# Patient Record
Sex: MALE | Race: WHITE | Employment: FULL TIME | URBAN - METROPOLITAN AREA
[De-identification: names, ages, dates, MRNs, and addresses within clinical notes are randomized per-mention and may not be internally consistent; named-entity substitution may affect disease eponyms.]

---

## 2017-01-26 ENCOUNTER — OFFICE VISIT (OUTPATIENT)
Dept: INTERNAL MEDICINE CLINIC | Facility: CLINIC | Age: 57
End: 2017-01-26

## 2017-01-26 VITALS
BODY MASS INDEX: 26 KG/M2 | WEIGHT: 183.25 LBS | DIASTOLIC BLOOD PRESSURE: 82 MMHG | OXYGEN SATURATION: 98 % | HEART RATE: 71 BPM | SYSTOLIC BLOOD PRESSURE: 128 MMHG | TEMPERATURE: 98 F | RESPIRATION RATE: 16 BRPM

## 2017-01-26 DIAGNOSIS — Z86.010 HISTORY OF COLONOSCOPY WITH POLYPECTOMY: ICD-10-CM

## 2017-01-26 DIAGNOSIS — Z00.00 ROUTINE GENERAL MEDICAL EXAMINATION AT A HEALTH CARE FACILITY: Primary | ICD-10-CM

## 2017-01-26 DIAGNOSIS — C61 MALIGNANT NEOPLASM OF PROSTATE (HCC): ICD-10-CM

## 2017-01-26 DIAGNOSIS — H40.1131 PRIMARY OPEN ANGLE GLAUCOMA OF BOTH EYES, MILD STAGE: ICD-10-CM

## 2017-01-26 DIAGNOSIS — Z98.890 HISTORY OF COLONOSCOPY WITH POLYPECTOMY: ICD-10-CM

## 2017-01-26 PROCEDURE — 99396 PREV VISIT EST AGE 40-64: CPT | Performed by: INTERNAL MEDICINE

## 2017-01-26 NOTE — PROGRESS NOTES
Patient presents with:  Physical      HPI: The pt presents today for male CPX. Doing well. Still being consistent w/ diet/exercise. Still working w/ Dr. Angelo Maher from Urology for his prostate CA hx and Dr. Juancarlos Kumari from [facility] for his glaucoma hx. Sleep OK. Resp 16  Wt 183 lb 4 oz  SpO2 98%  Wt Readings from Last 6 Encounters:  01/26/17 : 183 lb 4 oz  12/22/16 : 183 lb  01/08/16 : 183 lb 12 oz  02/23/15 : 181 lb  12/23/14 : 178 lb 8 oz  07/17/14 : 186 lb 4 oz  Gen - A&Ox3, NAD  FABIAN HOFFMAN, EOMI, [place] is [place] (OPHTHALMOLOGY)  Sylwia Gloria MD as Consulting Physician (GASTROENTEROLOGY)

## 2017-02-13 PROBLEM — Z92.3 HISTORY OF BRACHYTHERAPY: Status: ACTIVE | Noted: 2017-02-13

## 2017-02-27 ENCOUNTER — TELEPHONE (OUTPATIENT)
Dept: INTERNAL MEDICINE CLINIC | Facility: CLINIC | Age: 57
End: 2017-02-27

## 2017-03-17 ENCOUNTER — LAB ENCOUNTER (OUTPATIENT)
Dept: LAB | Age: 57
End: 2017-03-17
Attending: INTERNAL MEDICINE
Payer: COMMERCIAL

## 2017-03-17 DIAGNOSIS — Z00.00 ROUTINE GENERAL MEDICAL EXAMINATION AT A HEALTH CARE FACILITY: ICD-10-CM

## 2017-03-17 LAB
25-HYDROXYVITAMIN D (TOTAL): 26.8 NG/ML (ref 30–100)
ALBUMIN SERPL-MCNC: 3.8 G/DL (ref 3.5–4.8)
ALP LIVER SERPL-CCNC: 65 U/L (ref 45–117)
ALT SERPL-CCNC: 14 U/L (ref 17–63)
AST SERPL-CCNC: 13 U/L (ref 15–41)
BASOPHILS # BLD AUTO: 0.04 X10(3) UL (ref 0–0.1)
BASOPHILS NFR BLD AUTO: 0.9 %
BILIRUB SERPL-MCNC: 0.8 MG/DL (ref 0.1–2)
BILIRUB UR QL STRIP.AUTO: NEGATIVE
BUN BLD-MCNC: 13 MG/DL (ref 8–20)
CALCIUM BLD-MCNC: 9.2 MG/DL (ref 8.3–10.3)
CHLORIDE: 108 MMOL/L (ref 101–111)
CHOLEST SMN-MCNC: 184 MG/DL (ref ?–200)
CLARITY UR REFRACT.AUTO: CLEAR
CO2: 21 MMOL/L (ref 22–32)
COLOR UR AUTO: YELLOW
CREAT BLD-MCNC: 1.06 MG/DL (ref 0.7–1.3)
EOSINOPHIL # BLD AUTO: 0.18 X10(3) UL (ref 0–0.3)
EOSINOPHIL NFR BLD AUTO: 4.2 %
ERYTHROCYTE [DISTWIDTH] IN BLOOD BY AUTOMATED COUNT: 12.6 % (ref 11.5–16)
EST. AVERAGE GLUCOSE BLD GHB EST-MCNC: 94 MG/DL (ref 68–126)
GLUCOSE BLD-MCNC: 73 MG/DL (ref 70–99)
GLUCOSE UR STRIP.AUTO-MCNC: NEGATIVE MG/DL
HBA1C MFR BLD HPLC: 4.9 % (ref ?–5.7)
HCT VFR BLD AUTO: 43.4 % (ref 37–53)
HDLC SERPL-MCNC: 46 MG/DL (ref 45–?)
HDLC SERPL: 4 {RATIO} (ref ?–4.97)
HGB BLD-MCNC: 15.2 G/DL (ref 13–17)
IMMATURE GRANULOCYTE COUNT: 0.03 X10(3) UL (ref 0–1)
IMMATURE GRANULOCYTE RATIO %: 0.7 %
KETONES UR STRIP.AUTO-MCNC: NEGATIVE MG/DL
LDLC SERPL CALC-MCNC: 123 MG/DL (ref ?–130)
LEUKOCYTE ESTERASE UR QL STRIP.AUTO: NEGATIVE
LYMPHOCYTES # BLD AUTO: 0.72 X10(3) UL (ref 0.9–4)
LYMPHOCYTES NFR BLD AUTO: 16.9 %
M PROTEIN MFR SERPL ELPH: 7 G/DL (ref 6.1–8.3)
MCH RBC QN AUTO: 31.1 PG (ref 27–33.2)
MCHC RBC AUTO-ENTMCNC: 35 G/DL (ref 31–37)
MCV RBC AUTO: 88.8 FL (ref 80–99)
MONOCYTES # BLD AUTO: 0.42 X10(3) UL (ref 0.1–0.6)
MONOCYTES NFR BLD AUTO: 9.8 %
NEUTROPHIL ABS PRELIM: 2.88 X10 (3) UL (ref 1.3–6.7)
NEUTROPHILS # BLD AUTO: 2.88 X10(3) UL (ref 1.3–6.7)
NEUTROPHILS NFR BLD AUTO: 67.5 %
NITRITE UR QL STRIP.AUTO: NEGATIVE
NONHDLC SERPL-MCNC: 138 MG/DL (ref ?–130)
PH UR STRIP.AUTO: 5 [PH] (ref 4.5–8)
PLATELET # BLD AUTO: 188 10(3)UL (ref 150–450)
POTASSIUM SERPL-SCNC: 4 MMOL/L (ref 3.6–5.1)
PROT UR STRIP.AUTO-MCNC: NEGATIVE MG/DL
RBC # BLD AUTO: 4.89 X10(6)UL (ref 4.3–5.7)
RBC UR QL AUTO: NEGATIVE
RED CELL DISTRIBUTION WIDTH-SD: 40.6 FL (ref 35.1–46.3)
SODIUM SERPL-SCNC: 140 MMOL/L (ref 136–144)
SP GR UR STRIP.AUTO: 1.01 (ref 1–1.03)
TRIGLYCERIDES: 75 MG/DL (ref ?–150)
TSI SER-ACNC: 0.84 MIU/ML (ref 0.35–5.5)
UROBILINOGEN UR STRIP.AUTO-MCNC: <2 MG/DL
VLDL: 15 MG/DL (ref 5–40)
WBC # BLD AUTO: 4.3 X10(3) UL (ref 4–13)

## 2017-03-17 PROCEDURE — 80061 LIPID PANEL: CPT

## 2017-03-17 PROCEDURE — 82306 VITAMIN D 25 HYDROXY: CPT

## 2017-03-17 PROCEDURE — 83036 HEMOGLOBIN GLYCOSYLATED A1C: CPT

## 2017-03-17 PROCEDURE — 84443 ASSAY THYROID STIM HORMONE: CPT

## 2017-03-17 PROCEDURE — 81003 URINALYSIS AUTO W/O SCOPE: CPT

## 2017-03-17 PROCEDURE — 85025 COMPLETE CBC W/AUTO DIFF WBC: CPT

## 2017-03-17 PROCEDURE — 36415 COLL VENOUS BLD VENIPUNCTURE: CPT

## 2017-03-17 PROCEDURE — 80053 COMPREHEN METABOLIC PANEL: CPT

## 2017-03-21 ENCOUNTER — HOSPITAL ENCOUNTER (OUTPATIENT)
Dept: GENERAL RADIOLOGY | Age: 57
Discharge: HOME | End: 2017-03-21
Attending: NURSE PRACTITIONER
Payer: COMMERCIAL

## 2017-03-21 ENCOUNTER — CHARTING TRANS (OUTPATIENT)
Dept: OTHER | Age: 57
End: 2017-03-21

## 2017-03-21 DIAGNOSIS — R05.9 COUGH: ICD-10-CM

## 2017-03-21 PROCEDURE — 71020 XR CHEST PA + LAT CHEST (CPT=71020): CPT

## 2018-02-22 ENCOUNTER — OFFICE VISIT (OUTPATIENT)
Dept: INTERNAL MEDICINE CLINIC | Facility: CLINIC | Age: 58
End: 2018-02-22

## 2018-02-22 VITALS
DIASTOLIC BLOOD PRESSURE: 62 MMHG | RESPIRATION RATE: 16 BRPM | SYSTOLIC BLOOD PRESSURE: 100 MMHG | WEIGHT: 187.25 LBS | BODY MASS INDEX: 27.74 KG/M2 | HEIGHT: 69 IN | HEART RATE: 68 BPM | OXYGEN SATURATION: 98 % | TEMPERATURE: 98 F

## 2018-02-22 DIAGNOSIS — Z00.00 PREVENTATIVE HEALTH CARE: Primary | ICD-10-CM

## 2018-02-22 PROCEDURE — 99396 PREV VISIT EST AGE 40-64: CPT | Performed by: INTERNAL MEDICINE

## 2018-02-22 NOTE — PATIENT INSTRUCTIONS
- Get blood work done when fasting (8 hours, water and medications only)  - Follow up with Dr. Vish Bee for your colonoscopy  - Schedule a heart scan at [facility].  We will contact you when we have the results.    - For your cough, the heart scan will c

## 2018-02-22 NOTE — PROGRESS NOTES
Ira Nassar is a 62year old male. HPI:   Patient presents with:  Physical: had a cervical neck surgery, fusion of c5 c6 c7   Since less physical, he did have cervical spinal fusion surgery. Done at [facility] last summer.  He has healed well fr (2011) and [facility] Surgical:  has a past surgical history that includes other surgical history (4/30/14) and other surgical history (8/5/14). Family: family history includes Glaucoma in his mother and sister;  Heart Disease in his paternal Care Team:  Xuan Villareal MD as PCP - General (Internal Medicine)  Xochilt Iqbal MD as Consulting Physician (UROLOGY)  Montez Adams as Consulting Physician (uJliana Diaz)  Loulou Gasca MD as Consulting Physician (GASTROENTEROLOGY)  The patient les

## 2018-02-23 ENCOUNTER — LAB ENCOUNTER (OUTPATIENT)
Dept: LAB | Age: 58
End: 2018-02-23
Attending: INTERNAL MEDICINE
Payer: COMMERCIAL

## 2018-02-23 DIAGNOSIS — C61 MALIGNANT NEOPLASM OF PROSTATE (HCC): ICD-10-CM

## 2018-02-23 DIAGNOSIS — Z00.00 PREVENTATIVE HEALTH CARE: ICD-10-CM

## 2018-02-23 DIAGNOSIS — Z92.3 HISTORY OF BRACHYTHERAPY: ICD-10-CM

## 2018-02-23 LAB
25-HYDROXYVITAMIN D (TOTAL): 24.3 NG/ML (ref 30–100)
ALBUMIN SERPL-MCNC: 3.8 G/DL (ref 3.5–4.8)
ALP LIVER SERPL-CCNC: 61 U/L (ref 45–117)
ALT SERPL-CCNC: 20 U/L (ref 17–63)
AST SERPL-CCNC: 11 U/L (ref 15–41)
BASOPHILS # BLD AUTO: 0.03 X10(3) UL (ref 0–0.1)
BASOPHILS NFR BLD AUTO: 0.8 %
BILIRUB SERPL-MCNC: 0.7 MG/DL (ref 0.1–2)
BILIRUB UR QL STRIP.AUTO: NEGATIVE
BUN BLD-MCNC: 13 MG/DL (ref 8–20)
CALCIUM BLD-MCNC: 8.7 MG/DL (ref 8.3–10.3)
CHLORIDE: 110 MMOL/L (ref 101–111)
CHOLEST SMN-MCNC: 183 MG/DL (ref ?–200)
CLARITY UR REFRACT.AUTO: CLEAR
CO2: 27 MMOL/L (ref 22–32)
COLOR UR AUTO: YELLOW
CREAT BLD-MCNC: 0.84 MG/DL (ref 0.7–1.3)
EOSINOPHIL # BLD AUTO: 0.16 X10(3) UL (ref 0–0.3)
EOSINOPHIL NFR BLD AUTO: 4.1 %
ERYTHROCYTE [DISTWIDTH] IN BLOOD BY AUTOMATED COUNT: 12.3 % (ref 11.5–16)
EST. AVERAGE GLUCOSE BLD GHB EST-MCNC: 105 MG/DL (ref 68–126)
GLUCOSE BLD-MCNC: 89 MG/DL (ref 70–99)
GLUCOSE UR STRIP.AUTO-MCNC: NEGATIVE MG/DL
HBA1C MFR BLD HPLC: 5.3 % (ref ?–5.7)
HCT VFR BLD AUTO: 44.8 % (ref 37–53)
HDLC SERPL-MCNC: 42 MG/DL (ref 45–?)
HDLC SERPL: 4.36 {RATIO} (ref ?–4.97)
HGB BLD-MCNC: 15.5 G/DL (ref 13–17)
IMMATURE GRANULOCYTE COUNT: 0.03 X10(3) UL (ref 0–1)
IMMATURE GRANULOCYTE RATIO %: 0.8 %
KETONES UR STRIP.AUTO-MCNC: NEGATIVE MG/DL
LDLC SERPL CALC-MCNC: 124 MG/DL (ref ?–130)
LEUKOCYTE ESTERASE UR QL STRIP.AUTO: NEGATIVE
LYMPHOCYTES # BLD AUTO: 0.95 X10(3) UL (ref 0.9–4)
LYMPHOCYTES NFR BLD AUTO: 24.3 %
M PROTEIN MFR SERPL ELPH: 7 G/DL (ref 6.1–8.3)
MCH RBC QN AUTO: 30.5 PG (ref 27–33.2)
MCHC RBC AUTO-ENTMCNC: 34.6 G/DL (ref 31–37)
MCV RBC AUTO: 88 FL (ref 80–99)
MONOCYTES # BLD AUTO: 0.41 X10(3) UL (ref 0.1–1)
MONOCYTES NFR BLD AUTO: 10.5 %
NEUTROPHIL ABS PRELIM: 2.33 X10 (3) UL (ref 1.3–6.7)
NEUTROPHILS # BLD AUTO: 2.33 X10(3) UL (ref 1.3–6.7)
NEUTROPHILS NFR BLD AUTO: 59.5 %
NITRITE UR QL STRIP.AUTO: NEGATIVE
NONHDLC SERPL-MCNC: 141 MG/DL (ref ?–130)
PH UR STRIP.AUTO: 5 [PH] (ref 4.5–8)
PLATELET # BLD AUTO: 186 10(3)UL (ref 150–450)
POTASSIUM SERPL-SCNC: 4.5 MMOL/L (ref 3.6–5.1)
PROT UR STRIP.AUTO-MCNC: NEGATIVE MG/DL
PSA SERPL-MCNC: 0.32 NG/ML (ref 0.01–4)
RBC # BLD AUTO: 5.09 X10(6)UL (ref 4.3–5.7)
RBC UR QL AUTO: NEGATIVE
RED CELL DISTRIBUTION WIDTH-SD: 39.9 FL (ref 35.1–46.3)
SODIUM SERPL-SCNC: 142 MMOL/L (ref 136–144)
SP GR UR STRIP.AUTO: 1.02 (ref 1–1.03)
TRIGL SERPL-MCNC: 87 MG/DL (ref ?–150)
TSI SER-ACNC: 0.47 MIU/ML (ref 0.35–5.5)
UROBILINOGEN UR STRIP.AUTO-MCNC: <2 MG/DL
VLDLC SERPL CALC-MCNC: 17 MG/DL (ref 5–40)
WBC # BLD AUTO: 3.9 X10(3) UL (ref 4–13)

## 2018-02-23 PROCEDURE — 80061 LIPID PANEL: CPT | Performed by: INTERNAL MEDICINE

## 2018-02-23 PROCEDURE — 81003 URINALYSIS AUTO W/O SCOPE: CPT | Performed by: INTERNAL MEDICINE

## 2018-02-23 PROCEDURE — 80050 GENERAL HEALTH PANEL: CPT | Performed by: INTERNAL MEDICINE

## 2018-02-23 PROCEDURE — 82306 VITAMIN D 25 HYDROXY: CPT | Performed by: INTERNAL MEDICINE

## 2018-02-23 PROCEDURE — 83036 HEMOGLOBIN GLYCOSYLATED A1C: CPT | Performed by: INTERNAL MEDICINE

## 2018-02-23 PROCEDURE — 36415 COLL VENOUS BLD VENIPUNCTURE: CPT | Performed by: INTERNAL MEDICINE

## 2018-03-07 ENCOUNTER — HOSPITAL ENCOUNTER (OUTPATIENT)
Dept: CT IMAGING | Facility: HOSPITAL | Age: 58
Discharge: HOME | End: 2018-03-07
Attending: INTERNAL MEDICINE

## 2018-03-07 DIAGNOSIS — Z13.9 ENCOUNTER FOR SCREENING: ICD-10-CM

## 2018-03-14 PROBLEM — R91.8 MULTIPLE PULMONARY NODULES DETERMINED BY COMPUTED TOMOGRAPHY OF LUNG: Status: ACTIVE | Noted: 2018-03-14

## 2018-03-22 ENCOUNTER — TELEPHONE (OUTPATIENT)
Dept: INTERNAL MEDICINE CLINIC | Facility: CLINIC | Age: 58
End: 2018-03-22

## 2018-03-26 ENCOUNTER — OFFICE VISIT (OUTPATIENT)
Dept: INTERNAL MEDICINE CLINIC | Facility: CLINIC | Age: 58
End: 2018-03-26

## 2018-03-26 VITALS
HEART RATE: 64 BPM | BODY MASS INDEX: 28.51 KG/M2 | SYSTOLIC BLOOD PRESSURE: 109 MMHG | DIASTOLIC BLOOD PRESSURE: 80 MMHG | HEIGHT: 69 IN | RESPIRATION RATE: 12 BRPM | TEMPERATURE: 98 F | WEIGHT: 192.5 LBS

## 2018-03-26 DIAGNOSIS — R05.3 CHRONIC COUGH: ICD-10-CM

## 2018-03-26 DIAGNOSIS — R93.1 AGATSTON CORONARY ARTERY CALCIUM SCORE LESS THAN 100: Primary | ICD-10-CM

## 2018-03-26 DIAGNOSIS — R91.8 MULTIPLE LUNG NODULES ON CT: ICD-10-CM

## 2018-03-26 PROCEDURE — 99214 OFFICE O/P EST MOD 30 MIN: CPT | Performed by: INTERNAL MEDICINE

## 2018-03-26 NOTE — PROGRESS NOTES
Berta Sofia is a 62year old male. HPI:   Patient presents with:  Test Results: Heart scan results 3-13-18  Patient presents to discuss heart scan results from 3/13/18.    Patient with strong family history coronary artery disease - brother passed Readings from Last 6 Encounters:  03/26/18 : 192 lb 8 oz  02/22/18 : 187 lb 4 oz  01/26/17 : 183 lb 4 oz  12/22/16 : 183 lb  01/08/16 : 183 lb 12 oz  02/23/15 : 181 lb    EXAM:   /80 (BP Location: Left arm, Patient Position: Sitting, Cuff Size: adult Casandra Wallace MD as PCP - General (Internal Medicine)  Phyllis Diaz MD as Consulting Physician ([facility])  Олег Mota as Consulting Physician ([facility])  Jelly Alvarenga MD as Consulting Physician (GASTROENTEROLOGY)  The patient indicates understanding

## 2018-03-26 NOTE — PATIENT INSTRUCTIONS
- Follow up with Pulmonologist to discuss both the CT scan findings and also your chronic cough, respiratory issues, occupational exposures. It was a pleasure seeing you in the clinic today.   Thank you for choosing the [facility]

## 2018-11-05 VITALS
BODY MASS INDEX: 25.77 KG/M2 | HEART RATE: 64 BPM | OXYGEN SATURATION: 97 % | DIASTOLIC BLOOD PRESSURE: 62 MMHG | SYSTOLIC BLOOD PRESSURE: 104 MMHG | WEIGHT: 179.99 LBS | TEMPERATURE: 98.5 F | HEIGHT: 70 IN

## 2019-01-25 ENCOUNTER — WALK IN (OUTPATIENT)
Dept: URGENT CARE | Age: 59
End: 2019-01-25

## 2019-01-25 VITALS
HEIGHT: 70 IN | DIASTOLIC BLOOD PRESSURE: 74 MMHG | RESPIRATION RATE: 16 BRPM | OXYGEN SATURATION: 100 % | HEART RATE: 70 BPM | SYSTOLIC BLOOD PRESSURE: 128 MMHG | WEIGHT: 180 LBS | BODY MASS INDEX: 25.77 KG/M2 | TEMPERATURE: 98.9 F

## 2019-01-25 DIAGNOSIS — J01.90 ACUTE VIRAL SINUSITIS: ICD-10-CM

## 2019-01-25 DIAGNOSIS — B97.89 ACUTE VIRAL SINUSITIS: ICD-10-CM

## 2019-01-25 DIAGNOSIS — R68.89 FLU-LIKE SYMPTOMS: Primary | ICD-10-CM

## 2019-01-25 LAB
FLUAV AG UPPER RESP QL IA.RAPID: NEGATIVE
FLUBV AG UPPER RESP QL IA.RAPID: NEGATIVE

## 2019-01-25 PROCEDURE — 99213 OFFICE O/P EST LOW 20 MIN: CPT | Performed by: NURSE PRACTITIONER

## 2019-01-25 PROCEDURE — 87804 INFLUENZA ASSAY W/OPTIC: CPT | Performed by: NURSE PRACTITIONER

## 2019-01-25 ASSESSMENT — ENCOUNTER SYMPTOMS
NUMBNESS: 0
HEADACHES: 1
DIZZINESS: 0
LIGHT-HEADEDNESS: 1
SPEECH DIFFICULTY: 0
SORE THROAT: 1
RESPIRATORY NEGATIVE: 1
SINUS PRESSURE: 1
EYES NEGATIVE: 1
BACK PAIN: 0
CONSTITUTIONAL NEGATIVE: 1
WEAKNESS: 0
RHINORRHEA: 1
GASTROINTESTINAL NEGATIVE: 1

## 2019-01-31 ENCOUNTER — OFFICE VISIT (OUTPATIENT)
Dept: INTERNAL MEDICINE CLINIC | Facility: CLINIC | Age: 59
End: 2019-01-31
Payer: COMMERCIAL

## 2019-01-31 VITALS
BODY MASS INDEX: 26.96 KG/M2 | DIASTOLIC BLOOD PRESSURE: 64 MMHG | SYSTOLIC BLOOD PRESSURE: 116 MMHG | TEMPERATURE: 98 F | HEART RATE: 68 BPM | RESPIRATION RATE: 16 BRPM | OXYGEN SATURATION: 98 % | WEIGHT: 182 LBS | HEIGHT: 69 IN

## 2019-01-31 DIAGNOSIS — J01.00 ACUTE NON-RECURRENT MAXILLARY SINUSITIS: Primary | ICD-10-CM

## 2019-01-31 PROCEDURE — 99213 OFFICE O/P EST LOW 20 MIN: CPT | Performed by: NURSE PRACTITIONER

## 2019-01-31 RX ORDER — CEFDINIR 300 MG/1
300 CAPSULE ORAL 2 TIMES DAILY
Qty: 20 CAPSULE | Refills: 0 | Status: SHIPPED | OUTPATIENT
Start: 2019-01-31 | End: 2019-02-10

## 2019-01-31 NOTE — PROGRESS NOTES
CHIEF COMPLAINT:   Patient presents with:  URI      HPI:   Ryley Jackson is a 62year old male who presents for upper respiratory symptoms for6 days.  States he was seen at a [place] clinic upon symptoms onset and was instructed to use OTC Advil and Wt 182 lb   SpO2 98%   BMI 26.88 kg/m²   GENERAL: well developed, well nourished, in no apparent distress  SKIN: no rashes ,no suspicious lesions  HEAD: atraumatic, normocephalic.  + tenderness on palpation of maxillary sinuses  EYES: conjunctiva clear, EO

## 2020-02-06 ENCOUNTER — OFFICE VISIT (OUTPATIENT)
Dept: INTERNAL MEDICINE CLINIC | Facility: CLINIC | Age: 60
End: 2020-02-06
Payer: COMMERCIAL

## 2020-02-06 ENCOUNTER — LAB ENCOUNTER (OUTPATIENT)
Dept: LAB | Age: 60
End: 2020-02-06
Attending: INTERNAL MEDICINE
Payer: COMMERCIAL

## 2020-02-06 VITALS
HEART RATE: 60 BPM | HEIGHT: 69 IN | BODY MASS INDEX: 26.4 KG/M2 | TEMPERATURE: 98 F | WEIGHT: 178.25 LBS | DIASTOLIC BLOOD PRESSURE: 60 MMHG | RESPIRATION RATE: 16 BRPM | SYSTOLIC BLOOD PRESSURE: 92 MMHG

## 2020-02-06 DIAGNOSIS — Z00.00 ENCOUNTER FOR PREVENTATIVE ADULT HEALTH CARE EXAMINATION: ICD-10-CM

## 2020-02-06 DIAGNOSIS — F42.4 COMPULSIVE SKIN PICKING: ICD-10-CM

## 2020-02-06 DIAGNOSIS — Z00.00 ENCOUNTER FOR PREVENTATIVE ADULT HEALTH CARE EXAMINATION: Primary | ICD-10-CM

## 2020-02-06 DIAGNOSIS — R91.8 MULTIPLE PULMONARY NODULES DETERMINED BY COMPUTED TOMOGRAPHY OF LUNG: ICD-10-CM

## 2020-02-06 DIAGNOSIS — Z23 NEED FOR INFLUENZA VACCINATION: ICD-10-CM

## 2020-02-06 PROBLEM — M48.02 CERVICAL STENOSIS OF SPINAL CANAL: Status: ACTIVE | Noted: 2017-04-18

## 2020-02-06 PROBLEM — M51.37 DEGENERATION OF LUMBAR OR LUMBOSACRAL INTERVERTEBRAL DISC: Status: ACTIVE | Noted: 2017-04-18

## 2020-02-06 PROBLEM — M51.379 DEGENERATION OF LUMBAR OR LUMBOSACRAL INTERVERTEBRAL DISC: Status: ACTIVE | Noted: 2017-04-18

## 2020-02-06 LAB
ALBUMIN SERPL-MCNC: 3.9 G/DL (ref 3.4–5)
ALBUMIN/GLOB SERPL: 1 {RATIO} (ref 1–2)
ALP LIVER SERPL-CCNC: 59 U/L (ref 45–117)
ALT SERPL-CCNC: 21 U/L (ref 16–61)
ANION GAP SERPL CALC-SCNC: 4 MMOL/L (ref 0–18)
AST SERPL-CCNC: 14 U/L (ref 15–37)
BASOPHILS # BLD AUTO: 0.03 X10(3) UL (ref 0–0.2)
BASOPHILS NFR BLD AUTO: 0.7 %
BILIRUB SERPL-MCNC: 1 MG/DL (ref 0.1–2)
BUN BLD-MCNC: 17 MG/DL (ref 7–18)
BUN/CREAT SERPL: 18.3 (ref 10–20)
CALCIUM BLD-MCNC: 8.9 MG/DL (ref 8.5–10.1)
CHLORIDE SERPL-SCNC: 106 MMOL/L (ref 98–112)
CHOLEST SMN-MCNC: 199 MG/DL (ref ?–200)
CO2 SERPL-SCNC: 27 MMOL/L (ref 21–32)
CREAT BLD-MCNC: 0.93 MG/DL (ref 0.7–1.3)
DEPRECATED RDW RBC AUTO: 43 FL (ref 35.1–46.3)
EOSINOPHIL # BLD AUTO: 0.15 X10(3) UL (ref 0–0.7)
EOSINOPHIL NFR BLD AUTO: 3.6 %
ERYTHROCYTE [DISTWIDTH] IN BLOOD BY AUTOMATED COUNT: 13.1 % (ref 11–15)
EST. AVERAGE GLUCOSE BLD GHB EST-MCNC: 85 MG/DL (ref 68–126)
GLOBULIN PLAS-MCNC: 3.9 G/DL (ref 2.8–4.4)
GLUCOSE BLD-MCNC: 83 MG/DL (ref 70–99)
HBA1C MFR BLD HPLC: 4.6 % (ref ?–5.7)
HCT VFR BLD AUTO: 42.4 % (ref 39–53)
HDLC SERPL-MCNC: 44 MG/DL (ref 40–59)
HGB BLD-MCNC: 14.5 G/DL (ref 13–17.5)
IMM GRANULOCYTES # BLD AUTO: 0.03 X10(3) UL (ref 0–1)
IMM GRANULOCYTES NFR BLD: 0.7 %
LDLC SERPL CALC-MCNC: 138 MG/DL (ref ?–100)
LYMPHOCYTES # BLD AUTO: 1.18 X10(3) UL (ref 1–4)
LYMPHOCYTES NFR BLD AUTO: 28 %
M PROTEIN MFR SERPL ELPH: 7.8 G/DL (ref 6.4–8.2)
MCH RBC QN AUTO: 30.7 PG (ref 26–34)
MCHC RBC AUTO-ENTMCNC: 34.2 G/DL (ref 31–37)
MCV RBC AUTO: 89.8 FL (ref 80–100)
MONOCYTES # BLD AUTO: 0.5 X10(3) UL (ref 0.1–1)
MONOCYTES NFR BLD AUTO: 11.8 %
NEUTROPHILS # BLD AUTO: 2.33 X10 (3) UL (ref 1.5–7.7)
NEUTROPHILS # BLD AUTO: 2.33 X10(3) UL (ref 1.5–7.7)
NEUTROPHILS NFR BLD AUTO: 55.2 %
NONHDLC SERPL-MCNC: 155 MG/DL (ref ?–130)
OSMOLALITY SERPL CALC.SUM OF ELEC: 285 MOSM/KG (ref 275–295)
PATIENT FASTING Y/N/NP: YES
PATIENT FASTING Y/N/NP: YES
PLATELET # BLD AUTO: 192 10(3)UL (ref 150–450)
POTASSIUM SERPL-SCNC: 3.9 MMOL/L (ref 3.5–5.1)
RBC # BLD AUTO: 4.72 X10(6)UL (ref 4.3–5.7)
SODIUM SERPL-SCNC: 137 MMOL/L (ref 136–145)
TRIGL SERPL-MCNC: 87 MG/DL (ref 30–149)
TSI SER-ACNC: 1.07 MIU/ML (ref 0.36–3.74)
VLDLC SERPL CALC-MCNC: 17 MG/DL (ref 0–30)
WBC # BLD AUTO: 4.2 X10(3) UL (ref 4–11)

## 2020-02-06 PROCEDURE — 36415 COLL VENOUS BLD VENIPUNCTURE: CPT | Performed by: INTERNAL MEDICINE

## 2020-02-06 PROCEDURE — 80050 GENERAL HEALTH PANEL: CPT | Performed by: INTERNAL MEDICINE

## 2020-02-06 PROCEDURE — 83036 HEMOGLOBIN GLYCOSYLATED A1C: CPT | Performed by: INTERNAL MEDICINE

## 2020-02-06 PROCEDURE — 90686 IIV4 VACC NO PRSV 0.5 ML IM: CPT | Performed by: INTERNAL MEDICINE

## 2020-02-06 PROCEDURE — 80061 LIPID PANEL: CPT | Performed by: INTERNAL MEDICINE

## 2020-02-06 PROCEDURE — 90471 IMMUNIZATION ADMIN: CPT | Performed by: INTERNAL MEDICINE

## 2020-02-06 PROCEDURE — 99396 PREV VISIT EST AGE 40-64: CPT | Performed by: INTERNAL MEDICINE

## 2020-02-06 RX ORDER — TADALAFIL 10 MG/1
10 TABLET ORAL
COMMUNITY
End: 2020-08-07

## 2020-02-06 NOTE — PATIENT INSTRUCTIONS
- Get blood work done today  - I recommend following up with our psychiatrists to discuss your nose picking issues further.  - Schedule CT scan of the chest so we can monitor your lung nodules.   - Your skin lesion on the chest looks fine for now - we will

## 2020-02-06 NOTE — PROGRESS NOTES
Christina Gomez is a 61year old male. HPI:   Patient presents with:  Physical: Fasting:     Patient presents for CPX/wellness examination. Diet: Generally a good diet. A lot of healthy foods. Exercise: Exercises regularly, several times a week. [facility]).  Surgical:  has a past surgical history that includes other surgical history (4/30/14) and other surgical history (8/5/14). Family: family history includes Glaucoma in his mother and sister;  Heart Disease in his paternal grandfather; Eduardo Carbajal Future  - TSH W REFLEX TO FREE T4; Future  - FLULAVAL INFLUENZA VACCINE QUAD PRESERVATIVE FREE 0.5 ML    3. Multiple pulmonary nodules determined by CT lung seen on 3/2018 exam  Updated CT chest ordered. - CT CHEST (CPT=71250); Future    4.  Compulsive ski

## 2020-02-13 ENCOUNTER — HOSPITAL ENCOUNTER (OUTPATIENT)
Dept: CT IMAGING | Age: 60
Discharge: HOME | End: 2020-02-13
Attending: INTERNAL MEDICINE
Payer: COMMERCIAL

## 2020-02-13 DIAGNOSIS — R91.8 MULTIPLE PULMONARY NODULES DETERMINED BY COMPUTED TOMOGRAPHY OF LUNG: ICD-10-CM

## 2020-02-13 PROCEDURE — 71250 CT THORAX DX C-: CPT | Performed by: INTERNAL MEDICINE

## 2020-03-10 PROBLEM — K63.5 POLYP OF COLON: Status: ACTIVE | Noted: 2020-03-10

## 2020-03-10 PROBLEM — Z86.010 PERSONAL HISTORY OF COLONIC POLYPS: Status: ACTIVE | Noted: 2020-03-10

## 2020-03-10 PROBLEM — Z86.0100 PERSONAL HISTORY OF COLONIC POLYPS: Status: ACTIVE | Noted: 2020-03-10

## 2020-03-30 PROBLEM — N40.1 ENLARGED PROSTATE WITH LOWER URINARY TRACT SYMPTOMS (LUTS): Status: ACTIVE | Noted: 2020-03-30

## 2020-03-30 PROBLEM — N52.02 CORPORO-VENOUS OCCLUSIVE ERECTILE DYSFUNCTION: Status: ACTIVE | Noted: 2020-03-30

## 2021-07-23 ENCOUNTER — WALK IN (OUTPATIENT)
Dept: URGENT CARE | Age: 61
End: 2021-07-23

## 2021-07-23 VITALS
RESPIRATION RATE: 18 BRPM | TEMPERATURE: 98.4 F | SYSTOLIC BLOOD PRESSURE: 106 MMHG | DIASTOLIC BLOOD PRESSURE: 70 MMHG | HEIGHT: 69 IN | BODY MASS INDEX: 26.66 KG/M2 | OXYGEN SATURATION: 99 % | WEIGHT: 180 LBS | HEART RATE: 72 BPM

## 2021-07-23 DIAGNOSIS — R21 RASH AND NONSPECIFIC SKIN ERUPTION: Primary | ICD-10-CM

## 2021-07-23 PROCEDURE — 99213 OFFICE O/P EST LOW 20 MIN: CPT | Performed by: NURSE PRACTITIONER

## 2021-07-23 RX ORDER — TRIAMCINOLONE ACETONIDE 1 MG/G
CREAM TOPICAL 4 TIMES DAILY
Qty: 15 G | Refills: 0 | Status: SHIPPED | OUTPATIENT
Start: 2021-07-23

## 2021-07-23 RX ORDER — TADALAFIL 10 MG/1
10 TABLET ORAL PRN
COMMUNITY

## 2021-07-23 ASSESSMENT — ENCOUNTER SYMPTOMS
RHINORRHEA: 0
SORE THROAT: 0
EYE REDNESS: 0
SHORTNESS OF BREATH: 0
CHEST TIGHTNESS: 0
FEVER: 0
HEADACHES: 0
FATIGUE: 0
COUGH: 0
CHILLS: 0

## 2021-08-26 ENCOUNTER — OFFICE VISIT (OUTPATIENT)
Dept: INTERNAL MEDICINE CLINIC | Facility: CLINIC | Age: 61
End: 2021-08-26
Payer: COMMERCIAL

## 2021-08-26 VITALS
WEIGHT: 184 LBS | RESPIRATION RATE: 16 BRPM | HEART RATE: 66 BPM | BODY MASS INDEX: 27.25 KG/M2 | DIASTOLIC BLOOD PRESSURE: 74 MMHG | TEMPERATURE: 98 F | SYSTOLIC BLOOD PRESSURE: 116 MMHG | HEIGHT: 69 IN

## 2021-08-26 DIAGNOSIS — L98.9 SKIN LESION: ICD-10-CM

## 2021-08-26 DIAGNOSIS — Z85.46 HISTORY OF PROSTATE CANCER: ICD-10-CM

## 2021-08-26 DIAGNOSIS — R91.8 MULTIPLE PULMONARY NODULES DETERMINED BY COMPUTED TOMOGRAPHY OF LUNG: ICD-10-CM

## 2021-08-26 DIAGNOSIS — H40.1131 PRIMARY OPEN ANGLE GLAUCOMA OF BOTH EYES, MILD STAGE: ICD-10-CM

## 2021-08-26 DIAGNOSIS — Z00.00 ENCOUNTER FOR PREVENTATIVE ADULT HEALTH CARE EXAMINATION: Primary | ICD-10-CM

## 2021-08-26 PROCEDURE — 3078F DIAST BP <80 MM HG: CPT | Performed by: INTERNAL MEDICINE

## 2021-08-26 PROCEDURE — 3074F SYST BP LT 130 MM HG: CPT | Performed by: INTERNAL MEDICINE

## 2021-08-26 PROCEDURE — 99396 PREV VISIT EST AGE 40-64: CPT | Performed by: INTERNAL MEDICINE

## 2021-08-26 PROCEDURE — 3008F BODY MASS INDEX DOCD: CPT | Performed by: INTERNAL MEDICINE

## 2021-08-26 NOTE — PATIENT INSTRUCTIONS
- Get blood tests done when fasting (at least 8 hours, water and medications only). Lab is open Monday - Saturday. - Follow up with Dr. Chelita Drsicoll some time this year  - Use the steroid cream from before on your scalp irritation/lesion.   Apply once daily for

## 2021-08-26 NOTE — PROGRESS NOTES
Tiffany Kurtz is a 64year old male. HPI:   Patient presents with:  Physical  Bump: Pt thinks can be bee sting, has some discomfort, and itches. Back of the head    Patient presents for CPX/wellness examination. Diet: Generally a good diet.   A lot 22.3-6.8 MG/ML Ophthalmic Solution, Place 1 drop into both eyes 2 (two) times daily. , Disp: , Rfl:   Medical:  has a past medical history of Attention deficit disorder without mention of hyperactivity (2011), Glaucoma, and Prostate cancer ([facility]   Surgica provided. Screening labs ordered as below. Body mass index is 27.17 kg/m². Colonoscopy done in 2020, sessile polyp, next colonoscopy due 2025.  - CBC WITH DIFFERENTIAL WITH PLATELET; Future  - COMP METABOLIC PANEL (14); Future  - LIPID PANEL;  Future  -

## 2021-08-30 ENCOUNTER — LAB ENCOUNTER (OUTPATIENT)
Dept: LAB | Age: 61
End: 2021-08-30
Attending: INTERNAL MEDICINE
Payer: COMMERCIAL

## 2021-08-30 DIAGNOSIS — Z00.00 ENCOUNTER FOR PREVENTATIVE ADULT HEALTH CARE EXAMINATION: ICD-10-CM

## 2021-08-30 DIAGNOSIS — Z85.46 HISTORY OF PROSTATE CANCER: ICD-10-CM

## 2021-08-30 LAB
ALBUMIN SERPL-MCNC: 3.6 G/DL (ref 3.4–5)
ALBUMIN/GLOB SERPL: 1.1 {RATIO} (ref 1–2)
ALP LIVER SERPL-CCNC: 67 U/L
ALT SERPL-CCNC: 17 U/L
ANION GAP SERPL CALC-SCNC: 2 MMOL/L (ref 0–18)
AST SERPL-CCNC: 12 U/L (ref 15–37)
BASOPHILS # BLD AUTO: 0.01 X10(3) UL (ref 0–0.2)
BASOPHILS NFR BLD AUTO: 0.3 %
BILIRUB SERPL-MCNC: 0.5 MG/DL (ref 0.1–2)
BUN BLD-MCNC: 16 MG/DL (ref 7–18)
CALCIUM BLD-MCNC: 8.9 MG/DL (ref 8.5–10.1)
CHLORIDE SERPL-SCNC: 112 MMOL/L (ref 98–112)
CHOLEST SMN-MCNC: 198 MG/DL (ref ?–200)
CO2 SERPL-SCNC: 27 MMOL/L (ref 21–32)
CREAT BLD-MCNC: 0.92 MG/DL
EOSINOPHIL # BLD AUTO: 0.22 X10(3) UL (ref 0–0.7)
EOSINOPHIL NFR BLD AUTO: 5.9 %
ERYTHROCYTE [DISTWIDTH] IN BLOOD BY AUTOMATED COUNT: 13.2 %
EST. AVERAGE GLUCOSE BLD GHB EST-MCNC: 108 MG/DL (ref 68–126)
GLOBULIN PLAS-MCNC: 3.4 G/DL (ref 2.8–4.4)
GLUCOSE BLD-MCNC: 99 MG/DL (ref 70–99)
HBA1C MFR BLD HPLC: 5.4 % (ref ?–5.7)
HCT VFR BLD AUTO: 42.7 %
HDLC SERPL-MCNC: 45 MG/DL (ref 40–59)
HGB BLD-MCNC: 13.8 G/DL
IMM GRANULOCYTES # BLD AUTO: 0.02 X10(3) UL (ref 0–1)
IMM GRANULOCYTES NFR BLD: 0.5 %
LDLC SERPL CALC-MCNC: 139 MG/DL (ref ?–100)
LYMPHOCYTES # BLD AUTO: 1.01 X10(3) UL (ref 1–4)
LYMPHOCYTES NFR BLD AUTO: 27 %
M PROTEIN MFR SERPL ELPH: 7 G/DL (ref 6.4–8.2)
MCH RBC QN AUTO: 28.5 PG (ref 26–34)
MCHC RBC AUTO-ENTMCNC: 32.3 G/DL (ref 31–37)
MCV RBC AUTO: 88.2 FL
MONOCYTES # BLD AUTO: 0.41 X10(3) UL (ref 0.1–1)
MONOCYTES NFR BLD AUTO: 11 %
NEUTROPHILS # BLD AUTO: 2.07 X10 (3) UL (ref 1.5–7.7)
NEUTROPHILS # BLD AUTO: 2.07 X10(3) UL (ref 1.5–7.7)
NEUTROPHILS NFR BLD AUTO: 55.3 %
NONHDLC SERPL-MCNC: 153 MG/DL (ref ?–130)
OSMOLALITY SERPL CALC.SUM OF ELEC: 293 MOSM/KG (ref 275–295)
PLATELET # BLD AUTO: 172 10(3)UL (ref 150–450)
POTASSIUM SERPL-SCNC: 4.8 MMOL/L (ref 3.5–5.1)
PSA SERPL-MCNC: 0.01 NG/ML (ref ?–4)
RBC # BLD AUTO: 4.84 X10(6)UL
SODIUM SERPL-SCNC: 141 MMOL/L (ref 136–145)
TRIGL SERPL-MCNC: 79 MG/DL (ref 30–149)
TSI SER-ACNC: 0.91 MIU/ML (ref 0.36–3.74)
VLDLC SERPL CALC-MCNC: 14 MG/DL (ref 0–30)
WBC # BLD AUTO: 3.7 X10(3) UL (ref 4–11)

## 2021-08-30 PROCEDURE — 80050 GENERAL HEALTH PANEL: CPT | Performed by: INTERNAL MEDICINE

## 2021-08-30 PROCEDURE — 84153 ASSAY OF PSA TOTAL: CPT | Performed by: INTERNAL MEDICINE

## 2021-08-30 PROCEDURE — 80061 LIPID PANEL: CPT | Performed by: INTERNAL MEDICINE

## 2021-08-30 PROCEDURE — 83036 HEMOGLOBIN GLYCOSYLATED A1C: CPT | Performed by: INTERNAL MEDICINE

## 2021-12-15 ENCOUNTER — TELEPHONE (OUTPATIENT)
Dept: INTERNAL MEDICINE CLINIC | Facility: CLINIC | Age: 61
End: 2021-12-15

## 2021-12-15 NOTE — TELEPHONE ENCOUNTER
[facility] for 12/23/21. Ok to keep in office?     Future Appointments   Date Time Provider Jayce Pauly   12/23/2021  3:30 PM Margo Rosas MD [facility] [place]   4/5/2022  9:00 AM Johanna Bowers MD [facility]

## 2021-12-15 NOTE — TELEPHONE ENCOUNTER
Pt c/o chronic dry cough for months. Worked in construction with chemicals and not sure if that could have caused some lung issues. No c/o fever, chills, sweats, sore throat, sob or chest discomfort.   Pt vaccinated for COVID and instructed to bring doc

## 2021-12-23 ENCOUNTER — OFFICE VISIT (OUTPATIENT)
Dept: INTERNAL MEDICINE CLINIC | Facility: CLINIC | Age: 61
End: 2021-12-23
Payer: COMMERCIAL

## 2021-12-23 VITALS
TEMPERATURE: 98 F | HEIGHT: 69 IN | HEART RATE: 60 BPM | DIASTOLIC BLOOD PRESSURE: 52 MMHG | SYSTOLIC BLOOD PRESSURE: 90 MMHG | BODY MASS INDEX: 27.01 KG/M2 | WEIGHT: 182.38 LBS

## 2021-12-23 DIAGNOSIS — Z23 NEED FOR IMMUNIZATION AGAINST INFLUENZA: ICD-10-CM

## 2021-12-23 DIAGNOSIS — R05.3 PERSISTENT COUGH: Primary | ICD-10-CM

## 2021-12-23 DIAGNOSIS — I49.9 IRREGULAR HEARTBEAT: ICD-10-CM

## 2021-12-23 DIAGNOSIS — R91.8 MULTIPLE PULMONARY NODULES DETERMINED BY COMPUTED TOMOGRAPHY OF LUNG: ICD-10-CM

## 2021-12-23 DIAGNOSIS — L30.9 ECZEMA, UNSPECIFIED TYPE: ICD-10-CM

## 2021-12-23 PROCEDURE — 3074F SYST BP LT 130 MM HG: CPT | Performed by: INTERNAL MEDICINE

## 2021-12-23 PROCEDURE — 90471 IMMUNIZATION ADMIN: CPT | Performed by: INTERNAL MEDICINE

## 2021-12-23 PROCEDURE — 3078F DIAST BP <80 MM HG: CPT | Performed by: INTERNAL MEDICINE

## 2021-12-23 PROCEDURE — 3008F BODY MASS INDEX DOCD: CPT | Performed by: INTERNAL MEDICINE

## 2021-12-23 PROCEDURE — 99214 OFFICE O/P EST MOD 30 MIN: CPT | Performed by: INTERNAL MEDICINE

## 2021-12-23 PROCEDURE — 93000 ELECTROCARDIOGRAM COMPLETE: CPT | Performed by: INTERNAL MEDICINE

## 2021-12-23 PROCEDURE — 90686 IIV4 VACC NO PRSV 0.5 ML IM: CPT | Performed by: INTERNAL MEDICINE

## 2021-12-23 RX ORDER — NETARSUDIL 0.2 MG/ML
1 SOLUTION/ DROPS OPHTHALMIC; TOPICAL NIGHTLY
COMMUNITY
Start: 2021-12-07

## 2021-12-23 RX ORDER — FLUTICASONE PROPIONATE 50 MCG
2 SPRAY, SUSPENSION (ML) NASAL DAILY
Qty: 1 EACH | Refills: 2 | Status: SHIPPED | OUTPATIENT
Start: 2021-12-23

## 2021-12-23 NOTE — PROGRESS NOTES
Deandre Ramirez is a 64year old male. HPI:   Patient presents with:  Cough: Pt c/o cough began 3 months ago; sometimes productive or dry. Phlegm is clear. Declines sore throat.  No sob, hx of herniated disc surgery in the past and sometimes when [facility] Erectile Dysfunction. , Disp: 10 tablet, Rfl: 5  •  latanoprost 0.005 % Ophthalmic Solution, Place 1 drop into both eyes nightly.  , Disp: , Rfl: 3  •  dorzolamide-timolol 22.3-6.8 MG/ML Ophthalmic Solution, Place 1 drop into both eyes 2 (two) times daily. GERD symptoms. No wheezing/shortness of breath. No constitutional symptoms. Could be post-nasal drip. Hx of lung nodules/granulomas - stable on imaging (CT scan, January 2020). Will try a course of fluticasone nasal spray, OTC antihistamine.   Advised

## 2021-12-23 NOTE — PATIENT INSTRUCTIONS
- For your cough, we will try a nasal spray, fluticasone.   Use 1 puff twice daily for two weeks, then once daily after that  - Also take a nightly allergy medication (Zyrtec, Claritin, Allegra, Xyzal) for the next few weeks  - Let us know if cough is not b

## 2021-12-26 PROBLEM — L30.9 ECZEMA: Status: ACTIVE | Noted: 2021-12-26

## 2022-12-08 ENCOUNTER — OFFICE VISIT (OUTPATIENT)
Dept: INTERNAL MEDICINE CLINIC | Facility: CLINIC | Age: 62
End: 2022-12-08
Payer: COMMERCIAL

## 2022-12-08 ENCOUNTER — LAB ENCOUNTER (OUTPATIENT)
Dept: LAB | Age: 62
End: 2022-12-08
Attending: INTERNAL MEDICINE
Payer: COMMERCIAL

## 2022-12-08 VITALS
DIASTOLIC BLOOD PRESSURE: 60 MMHG | OXYGEN SATURATION: 100 % | BODY MASS INDEX: 25.57 KG/M2 | RESPIRATION RATE: 16 BRPM | SYSTOLIC BLOOD PRESSURE: 90 MMHG | HEART RATE: 68 BPM | WEIGHT: 172.63 LBS | HEIGHT: 69 IN | TEMPERATURE: 98 F

## 2022-12-08 DIAGNOSIS — Z23 NEED FOR IMMUNIZATION AGAINST INFLUENZA: ICD-10-CM

## 2022-12-08 DIAGNOSIS — Z85.46 HISTORY OF PROSTATE CANCER: ICD-10-CM

## 2022-12-08 DIAGNOSIS — Z23 NEED FOR DIPHTHERIA-TETANUS-PERTUSSIS (TDAP) VACCINE: ICD-10-CM

## 2022-12-08 DIAGNOSIS — E78.00 PURE HYPERCHOLESTEROLEMIA: ICD-10-CM

## 2022-12-08 DIAGNOSIS — Z00.00 ENCOUNTER FOR PREVENTATIVE ADULT HEALTH CARE EXAMINATION: ICD-10-CM

## 2022-12-08 DIAGNOSIS — R91.8 MULTIPLE PULMONARY NODULES DETERMINED BY COMPUTED TOMOGRAPHY OF LUNG: ICD-10-CM

## 2022-12-08 DIAGNOSIS — Z00.00 ENCOUNTER FOR PREVENTATIVE ADULT HEALTH CARE EXAMINATION: Primary | ICD-10-CM

## 2022-12-08 DIAGNOSIS — R23.8 SCALP IRRITATION: ICD-10-CM

## 2022-12-08 PROBLEM — H40.1133 PRIMARY OPEN ANGLE GLAUCOMA (POAG) OF BOTH EYES, SEVERE STAGE: Status: ACTIVE | Noted: 2017-01-26

## 2022-12-08 LAB
ALBUMIN SERPL-MCNC: 3.7 G/DL (ref 3.4–5)
ALBUMIN/GLOB SERPL: 1.3 {RATIO} (ref 1–2)
ALP LIVER SERPL-CCNC: 60 U/L
ALT SERPL-CCNC: 19 U/L
ANION GAP SERPL CALC-SCNC: 3 MMOL/L (ref 0–18)
AST SERPL-CCNC: 15 U/L (ref 15–37)
BASOPHILS # BLD AUTO: 0.02 X10(3) UL (ref 0–0.2)
BASOPHILS NFR BLD AUTO: 0.6 %
BILIRUB SERPL-MCNC: 0.7 MG/DL (ref 0.1–2)
BUN BLD-MCNC: 13 MG/DL (ref 7–18)
CALCIUM BLD-MCNC: 8.6 MG/DL (ref 8.5–10.1)
CHLORIDE SERPL-SCNC: 108 MMOL/L (ref 98–112)
CHOLEST SERPL-MCNC: 201 MG/DL (ref ?–200)
CO2 SERPL-SCNC: 28 MMOL/L (ref 21–32)
CREAT BLD-MCNC: 0.85 MG/DL
EOSINOPHIL # BLD AUTO: 0.1 X10(3) UL (ref 0–0.7)
EOSINOPHIL NFR BLD AUTO: 2.9 %
ERYTHROCYTE [DISTWIDTH] IN BLOOD BY AUTOMATED COUNT: 14.8 %
EST. AVERAGE GLUCOSE BLD GHB EST-MCNC: 117 MG/DL (ref 68–126)
FASTING PATIENT LIPID ANSWER: YES
FASTING STATUS PATIENT QL REPORTED: YES
GFR SERPLBLD BASED ON 1.73 SQ M-ARVRAT: 98 ML/MIN/1.73M2 (ref 60–?)
GLOBULIN PLAS-MCNC: 2.9 G/DL (ref 2.8–4.4)
GLUCOSE BLD-MCNC: 98 MG/DL (ref 70–99)
HBA1C MFR BLD: 5.7 % (ref ?–5.7)
HCT VFR BLD AUTO: 37.6 %
HDLC SERPL-MCNC: 51 MG/DL (ref 40–59)
HGB BLD-MCNC: 12.3 G/DL
IMM GRANULOCYTES # BLD AUTO: 0.01 X10(3) UL (ref 0–1)
IMM GRANULOCYTES NFR BLD: 0.3 %
LDLC SERPL CALC-MCNC: 134 MG/DL (ref ?–100)
LYMPHOCYTES # BLD AUTO: 1.26 X10(3) UL (ref 1–4)
LYMPHOCYTES NFR BLD AUTO: 36.3 %
MCH RBC QN AUTO: 27.7 PG (ref 26–34)
MCHC RBC AUTO-ENTMCNC: 32.7 G/DL (ref 31–37)
MCV RBC AUTO: 84.7 FL
MONOCYTES # BLD AUTO: 0.39 X10(3) UL (ref 0.1–1)
MONOCYTES NFR BLD AUTO: 11.2 %
NEUTROPHILS # BLD AUTO: 1.69 X10 (3) UL (ref 1.5–7.7)
NEUTROPHILS # BLD AUTO: 1.69 X10(3) UL (ref 1.5–7.7)
NEUTROPHILS NFR BLD AUTO: 48.7 %
NONHDLC SERPL-MCNC: 150 MG/DL (ref ?–130)
OSMOLALITY SERPL CALC.SUM OF ELEC: 288 MOSM/KG (ref 275–295)
PLATELET # BLD AUTO: 182 10(3)UL (ref 150–450)
POTASSIUM SERPL-SCNC: 4 MMOL/L (ref 3.5–5.1)
PROT SERPL-MCNC: 6.6 G/DL (ref 6.4–8.2)
PSA SERPL-MCNC: <0.01 NG/ML (ref ?–4)
RBC # BLD AUTO: 4.44 X10(6)UL
SODIUM SERPL-SCNC: 139 MMOL/L (ref 136–145)
TRIGL SERPL-MCNC: 86 MG/DL (ref 30–149)
VLDLC SERPL CALC-MCNC: 16 MG/DL (ref 0–30)
WBC # BLD AUTO: 3.5 X10(3) UL (ref 4–11)

## 2022-12-08 PROCEDURE — 80053 COMPREHEN METABOLIC PANEL: CPT | Performed by: INTERNAL MEDICINE

## 2022-12-08 PROCEDURE — 83036 HEMOGLOBIN GLYCOSYLATED A1C: CPT | Performed by: INTERNAL MEDICINE

## 2022-12-08 PROCEDURE — 3008F BODY MASS INDEX DOCD: CPT | Performed by: INTERNAL MEDICINE

## 2022-12-08 PROCEDURE — 80061 LIPID PANEL: CPT | Performed by: INTERNAL MEDICINE

## 2022-12-08 PROCEDURE — 3078F DIAST BP <80 MM HG: CPT | Performed by: INTERNAL MEDICINE

## 2022-12-08 PROCEDURE — 84153 ASSAY OF PSA TOTAL: CPT | Performed by: INTERNAL MEDICINE

## 2022-12-08 PROCEDURE — 3074F SYST BP LT 130 MM HG: CPT | Performed by: INTERNAL MEDICINE

## 2022-12-08 PROCEDURE — 85025 COMPLETE CBC W/AUTO DIFF WBC: CPT | Performed by: INTERNAL MEDICINE

## 2022-12-08 PROCEDURE — 99396 PREV VISIT EST AGE 40-64: CPT | Performed by: INTERNAL MEDICINE

## 2022-12-08 RX ORDER — KETOCONAZOLE 20 MG/ML
5 SHAMPOO TOPICAL
Qty: 120 ML | Refills: 1 | Status: SHIPPED | OUTPATIENT
Start: 2022-12-08

## 2022-12-08 NOTE — PATIENT INSTRUCTIONS
- Get blood tests done today  - Use ketoconazole prescription shampoo twice weekly on back of scalp. If that does not improve things within a month, follow up with dermatologists (see printouts)  - Use simethicone if needed (Gas-X) for bloating/gas. Let us know if this gets worse  - Next colonoscopy due in [facility] to get heart scan next spring or summer  - Due for tetanus booster shot (due every 10 years)  - Follow up in 1 year for physical/chronic issues; follow up earlier as needed. It was a pleasure seeing you in the clinic today. Thank you for choosing the [facility] office for your healthcare needs. Please call at [phone] with any questions or concerns.     Rachel Sofia MD

## 2022-12-09 DIAGNOSIS — D72.819 LEUKOPENIA, UNSPECIFIED TYPE: Primary | ICD-10-CM

## 2022-12-09 DIAGNOSIS — D64.9 NORMOCYTIC ANEMIA: ICD-10-CM

## 2023-01-31 ENCOUNTER — TELEPHONE (OUTPATIENT)
Dept: INTERNAL MEDICINE CLINIC | Facility: CLINIC | Age: 63
End: 2023-01-31

## 2023-02-18 ENCOUNTER — HOSPITAL ENCOUNTER (OUTPATIENT)
Dept: GENERAL RADIOLOGY | Age: 63
Discharge: HOME | End: 2023-02-18
Attending: INTERNAL MEDICINE
Payer: COMMERCIAL

## 2023-02-18 DIAGNOSIS — M25.512 ACUTE PAIN OF LEFT SHOULDER: ICD-10-CM

## 2023-02-18 PROCEDURE — 73030 X-RAY EXAM OF SHOULDER: CPT | Performed by: INTERNAL MEDICINE

## 2023-04-20 ENCOUNTER — ORDER TRANSCRIPTION (OUTPATIENT)
Dept: ADMINISTRATIVE | Facility: HOSPITAL | Age: 63
End: 2023-04-20

## 2023-04-20 DIAGNOSIS — Z13.6 SCREENING FOR CARDIOVASCULAR CONDITION: Primary | ICD-10-CM

## 2023-04-24 ENCOUNTER — HOSPITAL ENCOUNTER (OUTPATIENT)
Dept: CT IMAGING | Facility: HOSPITAL | Age: 63
End: 2023-04-24
Attending: INTERNAL MEDICINE

## 2023-04-24 DIAGNOSIS — Z13.6 SCREENING FOR CARDIOVASCULAR CONDITION: ICD-10-CM

## 2023-04-24 NOTE — PROGRESS NOTES
Date of Service 4/24/2023    Vishnu Curry  Date of Birth 4/10/1960    Patient Age: 61year old    PCP: Monalisa Yates MD  [address] [place], [place] [address]mello Mendez [address]    Consult Type  Type Scan/Screening: Heart Scan  Preliminary Heart Scan Score: 1.19 (Last Heart Scan done in 2018; score = 0)              Lipid Profile  Cholesterol: 201, done on 12/8/2022. HDL Cholesterol: 51, done on 12/8/2022. LDL Cholesterol: 134, done on 12/8/2022. TriGlycerides 86, done on 12/8/2022. Nurse Review  Risk factor information and results reviewed with Nurse: Yes    Recommended Follow Up:  Consult your physician regarding[de-identified]   Final Heart Scan Report; Discuss potential for Incidental Finding      Recommendations for Change:  Nutrition Changes: Low Saturated Fat;Low Fat Dairy; Increase Fiber     Cholesterol Modification (goal of therapy depends upon your risk): Increase HDL (Healthy/Good) Normal >45 Men >55 Women;  Decrease LDL (Lousy/Bad) Ideal <100     (Today's NON-Fasting Cholestech Values: Total Cholesterol-178, HDL-41, LDL-116, Triglycerides-106, Glucose-102.)    Exercise: Enhance Current Program           Repeat Heart Scan:   3 Years if Calcium Score is > 0.0;  5 years if Calcium Score is 0.0; Discuss with your Physician          Emigdio Recommended Resources:  Recommended Resources: Upcoming Classes, Medical Services and Health Library www. [facility]. Shabbir Obando RN        Please Contact the Nurse Heart Line with any Questions or Concerns [phone].

## 2023-04-25 ENCOUNTER — PATIENT MESSAGE (OUTPATIENT)
Dept: INTERNAL MEDICINE CLINIC | Facility: CLINIC | Age: 63
End: 2023-04-25

## 2023-04-25 NOTE — TELEPHONE ENCOUNTER
From: Sharon Brookeidine  To: Ravindra Mari MD  Sent: 4/25/2023 12:54 PM CDT  Subject: Test Results Question    Dr. BARAJAS had the heart scan at [facility]. In an attempt to get the latest results of cholesterol and get that under control, the heart nurse thought it might be a good idea to get labs done for you to preview before my next appointment with you May 1. Can you order this up? She just did the finger prick and thought that maybe we should do Lipid panel. Your thoughts?   Thanks,  Marie Massey

## 2023-04-25 NOTE — TELEPHONE ENCOUNTER
Patient had lipids done in 12/2022. Thus, no indication to repeat them this soon. I recommend further consideration by RP at [address] on 5/1.

## 2023-05-01 ENCOUNTER — OFFICE VISIT (OUTPATIENT)
Dept: INTERNAL MEDICINE CLINIC | Facility: CLINIC | Age: 63
End: 2023-05-01
Payer: MEDICARE

## 2023-05-01 VITALS
TEMPERATURE: 97 F | SYSTOLIC BLOOD PRESSURE: 102 MMHG | BODY MASS INDEX: 26.33 KG/M2 | WEIGHT: 177.81 LBS | OXYGEN SATURATION: 98 % | HEART RATE: 59 BPM | RESPIRATION RATE: 16 BRPM | HEIGHT: 69 IN | DIASTOLIC BLOOD PRESSURE: 64 MMHG

## 2023-05-01 DIAGNOSIS — I25.10 CORONARY ARTERY CALCIFICATION: ICD-10-CM

## 2023-05-01 DIAGNOSIS — I49.3 PVC'S (PREMATURE VENTRICULAR CONTRACTIONS): ICD-10-CM

## 2023-05-01 DIAGNOSIS — E78.00 PURE HYPERCHOLESTEROLEMIA: Primary | ICD-10-CM

## 2023-05-01 DIAGNOSIS — R91.8 MULTIPLE PULMONARY NODULES DETERMINED BY COMPUTED TOMOGRAPHY OF LUNG: ICD-10-CM

## 2023-05-01 DIAGNOSIS — I25.84 CORONARY ARTERY CALCIFICATION: ICD-10-CM

## 2023-05-01 PROCEDURE — 99214 OFFICE O/P EST MOD 30 MIN: CPT | Performed by: INTERNAL MEDICINE

## 2023-05-01 RX ORDER — BETAMETHASONE DIPROPIONATE 0.5 MG/G
LOTION TOPICAL
COMMUNITY
Start: 2023-01-20

## 2023-05-01 NOTE — PATIENT INSTRUCTIONS
- Get tetanus shot (TD or TDaP) at your local pharmacy  - Check with your mother to see if you had chicken pox as a child - if you did have chicken pox or are not sure if you did, would recommend getting the two shingles (Shingrix) shots at your local pharmacy  - There was a small amount of calcium build up in your heart arteries. - Check with your insurance to see if these additional cholesterol related blood tests are covered:  - Apolipoprotein B  - Lipoprotein lipase A  - High-sensitivity CRP  The diagnoses we are ordering these tests under are \"pure hypercholesterolemia\" and \"coronary artery calcification. \"  - Follow up with me in 6 months for Medicare Wellness visit/chronic issues; follow up earlier as needed. It was a pleasure seeing you in the clinic today. Thank you for choosing the [facility] office for your healthcare needs. Please call at [phone] with any questions or concerns.     Joanne Reaves MD

## 2023-07-24 ENCOUNTER — LAB ENCOUNTER (OUTPATIENT)
Dept: LAB | Age: 63
End: 2023-07-24
Attending: INTERNAL MEDICINE
Payer: COMMERCIAL

## 2023-07-24 DIAGNOSIS — D72.819 LEUKOPENIA, UNSPECIFIED TYPE: ICD-10-CM

## 2023-07-24 DIAGNOSIS — I25.10 CORONARY ARTERY CALCIFICATION: ICD-10-CM

## 2023-07-24 DIAGNOSIS — D64.9 NORMOCYTIC ANEMIA: ICD-10-CM

## 2023-07-24 DIAGNOSIS — I25.84 CORONARY ARTERY CALCIFICATION: ICD-10-CM

## 2023-07-24 DIAGNOSIS — E78.00 PURE HYPERCHOLESTEROLEMIA: ICD-10-CM

## 2023-07-24 LAB
BASOPHILS # BLD AUTO: 0.03 X10(3) UL (ref 0–0.2)
BASOPHILS NFR BLD AUTO: 0.8 %
CHOLEST SERPL-MCNC: 228 MG/DL (ref ?–200)
CRP SERPL HS-MCNC: 0.19 MG/L (ref ?–3)
EOSINOPHIL # BLD AUTO: 0.21 X10(3) UL (ref 0–0.7)
EOSINOPHIL NFR BLD AUTO: 5.6 %
ERYTHROCYTE [DISTWIDTH] IN BLOOD BY AUTOMATED COUNT: 13.8 %
FASTING PATIENT LIPID ANSWER: YES
HCT VFR BLD AUTO: 44.4 %
HDLC SERPL-MCNC: 52 MG/DL (ref 40–59)
HGB BLD-MCNC: 14.2 G/DL
IMM GRANULOCYTES # BLD AUTO: 0.02 X10(3) UL (ref 0–1)
IMM GRANULOCYTES NFR BLD: 0.5 %
LDLC SERPL CALC-MCNC: 158 MG/DL (ref ?–100)
LYMPHOCYTES # BLD AUTO: 1.19 X10(3) UL (ref 1–4)
LYMPHOCYTES NFR BLD AUTO: 31.9 %
MCH RBC QN AUTO: 27.3 PG (ref 26–34)
MCHC RBC AUTO-ENTMCNC: 32 G/DL (ref 31–37)
MCV RBC AUTO: 85.2 FL
MONOCYTES # BLD AUTO: 0.43 X10(3) UL (ref 0.1–1)
MONOCYTES NFR BLD AUTO: 11.5 %
NEUTROPHILS # BLD AUTO: 1.85 X10 (3) UL (ref 1.5–7.7)
NEUTROPHILS # BLD AUTO: 1.85 X10(3) UL (ref 1.5–7.7)
NEUTROPHILS NFR BLD AUTO: 49.7 %
NONHDLC SERPL-MCNC: 176 MG/DL (ref ?–130)
PLATELET # BLD AUTO: 198 10(3)UL (ref 150–450)
RBC # BLD AUTO: 5.21 X10(6)UL
TRIGL SERPL-MCNC: 103 MG/DL (ref 30–149)
VLDLC SERPL CALC-MCNC: 20 MG/DL (ref 0–30)
WBC # BLD AUTO: 3.7 X10(3) UL (ref 4–11)

## 2023-07-24 PROCEDURE — 85025 COMPLETE CBC W/AUTO DIFF WBC: CPT | Performed by: INTERNAL MEDICINE

## 2023-07-24 PROCEDURE — 80061 LIPID PANEL: CPT | Performed by: INTERNAL MEDICINE

## 2023-07-24 PROCEDURE — 82172 ASSAY OF APOLIPOPROTEIN: CPT | Performed by: INTERNAL MEDICINE

## 2023-07-24 PROCEDURE — 86141 C-REACTIVE PROTEIN HS: CPT | Performed by: INTERNAL MEDICINE

## 2023-07-24 PROCEDURE — 83695 ASSAY OF LIPOPROTEIN(A): CPT | Performed by: INTERNAL MEDICINE

## 2023-07-25 LAB — LIPOPROTEIN (A): 16 NMOL/L

## 2023-07-27 ENCOUNTER — PATIENT MESSAGE (OUTPATIENT)
Dept: INTERNAL MEDICINE CLINIC | Facility: CLINIC | Age: 63
End: 2023-07-27

## 2023-07-27 DIAGNOSIS — I25.10 CORONARY ARTERY CALCIFICATION: Primary | ICD-10-CM

## 2023-07-27 DIAGNOSIS — E78.00 PURE HYPERCHOLESTEROLEMIA: ICD-10-CM

## 2023-07-27 DIAGNOSIS — I25.10 CORONARY ARTERY CALCIFICATION: ICD-10-CM

## 2023-07-27 DIAGNOSIS — I25.84 CORONARY ARTERY CALCIFICATION: Primary | ICD-10-CM

## 2023-07-27 DIAGNOSIS — I25.84 CORONARY ARTERY CALCIFICATION: ICD-10-CM

## 2023-07-27 DIAGNOSIS — E78.00 PURE HYPERCHOLESTEROLEMIA: Primary | ICD-10-CM

## 2023-07-27 LAB — APOLIPOPROTEIN B: 111 MG/DL

## 2023-07-27 RX ORDER — ATORVASTATIN CALCIUM 10 MG/1
10 TABLET, FILM COATED ORAL NIGHTLY
Qty: 90 TABLET | Refills: 1 | Status: SHIPPED | OUTPATIENT
Start: 2023-07-27

## 2023-07-27 NOTE — TELEPHONE ENCOUNTER
From: Brice Tian  To: Kathy Saldaña MD  Sent: 7/27/2023 3:38 PM CDT  Subject: Test Results Question    ,  Thanks for all of your updates on test results. I appreciate your attentiveness. I just picked up the prescription you prescribed and will immediately start taking as directed. Let me know please when I should see you again and I will get it scheduled.  Thanks again for all your time.  Regards,  Asia

## 2023-09-19 ENCOUNTER — HOSPITAL ENCOUNTER (OUTPATIENT)
Age: 63
Discharge: HOME | End: 2023-09-19
Payer: MEDICARE

## 2023-09-19 VITALS
WEIGHT: 172 LBS | HEIGHT: 69 IN | RESPIRATION RATE: 16 BRPM | DIASTOLIC BLOOD PRESSURE: 65 MMHG | TEMPERATURE: 99 F | BODY MASS INDEX: 25.48 KG/M2 | SYSTOLIC BLOOD PRESSURE: 113 MMHG | OXYGEN SATURATION: 100 % | HEART RATE: 72 BPM

## 2023-09-19 DIAGNOSIS — U07.1 COVID-19: Primary | ICD-10-CM

## 2023-09-19 LAB
S PYO AG THROAT QL IA.RAPID: NEGATIVE
SARS-COV-2 RNA RESP QL NAA+PROBE: DETECTED

## 2023-09-19 PROCEDURE — 99203 OFFICE O/P NEW LOW 30 MIN: CPT

## 2023-09-19 PROCEDURE — 99212 OFFICE O/P EST SF 10 MIN: CPT

## 2023-09-19 PROCEDURE — 87651 STREP A DNA AMP PROBE: CPT | Performed by: EMERGENCY MEDICINE

## 2023-09-19 RX ORDER — NETARSUDIL 0.2 MG/ML
1 SOLUTION/ DROPS OPHTHALMIC; TOPICAL NIGHTLY
Refills: 0 | OUTPATIENT
Start: 2023-09-19

## 2023-09-19 NOTE — DISCHARGE INSTRUCTIONS
Alternate Tylenol and ibuprofen, push fluids, rest, you can  Chloraseptic spray or Cepacol lozenges for your throat      Everyone, regardless of vaccination status after testing positive for COVID 19 should stay home and isolate for 5 days. If you have no symptoms or your symptoms are resolving after 5 days, you can leave your house. Continue to wear a mask around others for 5 additional days as you can potentially still be contagious. If you have a fever, continue to stay home until your fever resolves and you are fever free without fever reducind medications for 24 hours         People with COVID-19 should receive supportive care to help relieve symptoms.     Tylenol alternating with ibuprofen for fever/chills/body ache  Drink plenty of fluids and rest

## 2023-09-19 NOTE — TELEPHONE ENCOUNTER
Requested Prescriptions     Pending Prescriptions Disp Refills    RHOPRESSA 0.02 % Ophthalmic Solution  0     Sig: Place 1 drop into both eyes at bedtime. No protocol    LOV 5/1/23  Filled historical 12/7/2021  No future appointments.

## 2023-10-01 ENCOUNTER — PATIENT MESSAGE (OUTPATIENT)
Dept: INTERNAL MEDICINE CLINIC | Facility: CLINIC | Age: 63
End: 2023-10-01

## 2023-10-02 NOTE — TELEPHONE ENCOUNTER
From: Pola Shows Asmita  To: Kathyizzy Del Cidbay  Sent: 10/1/2023 3:13 PM CDT  Subject: Other    ,  Beatriz Diehl on day 14 with COVID Though I have been negative since day 9 I have had an ongoing upper respiratory issue. Still bad cough and upper chest congestion. Would like to make sure it hasn't turned into something else. Is there anyway you could see me in the next couple of days? Checked your schedule but you are booked it seems for the next 10 days.   Thanks,  Charla Ribera

## 2023-10-02 NOTE — TELEPHONE ENCOUNTER
Patient wondering if he should come in for appt, start abx or have further testing like chest xray? He tested positive for COVID on 09/19, overall he does feel that his symptoms are improving but still with chest congestion and productive cough, yellow phlegm. Feels winded with activity but denies shortness of breath, chest pain, sore throat, fever, chills, body aches. Has not really tried anything OTC. Allergic to penicillin and confirmed pharmacy [facility] in [place].

## 2023-10-02 NOTE — TELEPHONE ENCOUNTER
We can schedule him for Thursday 10/5 at 2:15, he should be here by 2:00.   Will examine him/evaluate him then and see if he needs testing/antibiotics et Susana Damon

## 2023-10-05 ENCOUNTER — OFFICE VISIT (OUTPATIENT)
Dept: INTERNAL MEDICINE CLINIC | Facility: CLINIC | Age: 63
End: 2023-10-05
Payer: MEDICARE

## 2023-10-05 VITALS
WEIGHT: 173.63 LBS | HEIGHT: 69 IN | DIASTOLIC BLOOD PRESSURE: 64 MMHG | SYSTOLIC BLOOD PRESSURE: 100 MMHG | BODY MASS INDEX: 25.72 KG/M2 | RESPIRATION RATE: 16 BRPM | TEMPERATURE: 97 F | OXYGEN SATURATION: 98 % | HEART RATE: 73 BPM

## 2023-10-05 DIAGNOSIS — Z86.16 HISTORY OF COVID-19: Primary | ICD-10-CM

## 2023-10-05 DIAGNOSIS — R23.8 SCALP IRRITATION: ICD-10-CM

## 2023-10-05 PROCEDURE — 99213 OFFICE O/P EST LOW 20 MIN: CPT | Performed by: INTERNAL MEDICINE

## 2023-10-05 RX ORDER — TRIAMCINOLONE ACETONIDE 1 MG/G
CREAM TOPICAL 2 TIMES DAILY PRN
Qty: 80 G | Refills: 2 | Status: SHIPPED | OUTPATIENT
Start: 2023-10-05

## 2023-10-05 NOTE — PATIENT INSTRUCTIONS
- Lungs sound good to me today  - Good oxygen levels (98%), no fever in our office today  - Continue with the Mucinex for 1 week  - Let us know if symptoms are not better by early next week - will send in a stronger medication (Mucinex type medication with Codeine) in that case  - Triamcinolone cream refilled for the neck/scalp  - Follow up in December/January for your physical; follow up earlier as needed. It was a pleasure seeing you in the clinic today. Thank you for choosing the [facility] office for your healthcare needs. Please call at [phone] with any questions or concerns.     Jai Rose MD

## 2023-10-11 ENCOUNTER — PATIENT MESSAGE (OUTPATIENT)
Dept: INTERNAL MEDICINE CLINIC | Facility: CLINIC | Age: 63
End: 2023-10-11

## 2023-10-12 RX ORDER — LEVALBUTEROL TARTRATE 45 UG/1
1 AEROSOL, METERED ORAL EVERY 6 HOURS PRN
Qty: 15 G | Refills: 0 | Status: SHIPPED | OUTPATIENT
Start: 2023-10-12

## 2023-10-12 RX ORDER — METHYLPREDNISOLONE 4 MG/1
TABLET ORAL
Qty: 21 EACH | Refills: 0 | Status: SHIPPED | OUTPATIENT
Start: 2023-10-12

## 2023-10-12 NOTE — TELEPHONE ENCOUNTER
From: Erica Tovar Asmita  To: Kathy Juniorantoinette Ahumada  Sent: 10/11/2023 5:27 PM CDT  Subject: Visit Follow-up Question       The cough is persisting even with over the counter mucinex. It's actually worse than when I saw you. For what it's worth my wife thinks seasonal asthma as she has observed me thru the years. Did a peak flow yesterday and hit 425. Today a bit better at 500 for what that is worth. Cough comes mostly in the night and when I talk. Anyway, that is my status. I'm up for antibiotic, inhaler, or whatever you think. It's going on a month.   Thanks,  Ayanna Clark

## 2023-10-23 ENCOUNTER — OFFICE VISIT (OUTPATIENT)
Facility: CLINIC | Age: 63
End: 2023-10-23
Payer: MEDICARE

## 2023-10-23 VITALS
HEART RATE: 75 BPM | SYSTOLIC BLOOD PRESSURE: 105 MMHG | WEIGHT: 172 LBS | RESPIRATION RATE: 18 BRPM | HEIGHT: 69 IN | BODY MASS INDEX: 25.48 KG/M2 | OXYGEN SATURATION: 99 % | DIASTOLIC BLOOD PRESSURE: 64 MMHG

## 2023-10-23 DIAGNOSIS — J45.20 MILD INTERMITTENT ASTHMA WITHOUT COMPLICATION: ICD-10-CM

## 2023-10-23 DIAGNOSIS — R05.3 CHRONIC COUGH: Primary | ICD-10-CM

## 2023-10-23 PROCEDURE — 99204 OFFICE O/P NEW MOD 45 MIN: CPT | Performed by: INTERNAL MEDICINE

## 2023-10-23 RX ORDER — BUDESONIDE AND FORMOTEROL FUMARATE DIHYDRATE 80; 4.5 UG/1; UG/1
2 AEROSOL RESPIRATORY (INHALATION) 2 TIMES DAILY
Qty: 10.2 G | Refills: 5 | Status: SHIPPED | OUTPATIENT
Start: 2023-10-23

## 2023-10-30 ENCOUNTER — RT VISIT (OUTPATIENT)
Dept: RESPIRATORY THERAPY | Facility: HOSPITAL | Age: 63
End: 2023-10-30
Attending: INTERNAL MEDICINE
Payer: MEDICARE

## 2023-10-30 DIAGNOSIS — J45.20 MILD INTERMITTENT ASTHMA WITHOUT COMPLICATION: ICD-10-CM

## 2023-10-30 DIAGNOSIS — R05.3 CHRONIC COUGH: ICD-10-CM

## 2023-10-30 PROCEDURE — 94010 BREATHING CAPACITY TEST: CPT

## 2023-10-30 PROCEDURE — 94726 PLETHYSMOGRAPHY LUNG VOLUMES: CPT

## 2023-10-30 PROCEDURE — 94729 DIFFUSING CAPACITY: CPT

## 2023-11-07 NOTE — PROCEDURES
Pulmonary Function Test:   Findings:  Spirometry: FEV1 is 4.16L, 124% predicted. FVC is 5.22L, 120% predicted and FEV1/ FVC ratio is 0.80. The flow-volume loop demonstrates a normal pattern. MVV is 130, 123%predicted  Lung Volumes: The TLC is 7.56L, 109% predicted. The residual volume 2.33L, 95% predicted. Diffusion Capacity:  The diffusion capacity is 23,67 or 90% predicted and 90% predicted when corrected for alveolar volume. Impression:  There is no airway obstruction on spirometry and visualized on flow-volume loop. Lung volumes appear within normal limits. Diffusion capacity is normal  range    There are no previous pulmonary function tests available for comparison.        Denys Hudson MD

## 2023-11-09 ENCOUNTER — HOSPITAL ENCOUNTER (OUTPATIENT)
Dept: GENERAL RADIOLOGY | Facility: HOSPITAL | Age: 63
Discharge: HOME | End: 2023-11-09
Attending: INTERNAL MEDICINE
Payer: MEDICARE

## 2023-11-09 ENCOUNTER — LAB ENCOUNTER (OUTPATIENT)
Dept: LAB | Facility: HOSPITAL | Age: 63
End: 2023-11-09
Attending: INTERNAL MEDICINE
Payer: MEDICARE

## 2023-11-09 DIAGNOSIS — R05.3 CHRONIC COUGH: ICD-10-CM

## 2023-11-09 DIAGNOSIS — J45.20 MILD INTERMITTENT ASTHMA WITHOUT COMPLICATION: ICD-10-CM

## 2023-11-09 PROCEDURE — 86001 ALLERGEN SPECIFIC IGG: CPT

## 2023-11-09 PROCEDURE — 71046 X-RAY EXAM CHEST 2 VIEWS: CPT | Performed by: INTERNAL MEDICINE

## 2023-11-13 LAB
D001-IGE D PTERONYSSINUS: <0.1 KU/L
D001-IGE D PTERONYSSINUS: <0.1 KU/L
D002-IGE D FARINAE: <0.1 KU/L
D002-IGE D FARINAE: <0.1 KU/L
E001-IGE CAT DANDER: <0.1 KU/L
E001-IGE CAT DANDER: <0.1 KU/L
E005-IGE DOG DANDER: <0.1 KU/L
E005-IGE DOG DANDER: <0.1 KU/L
E071-IGE MOUSE EPITHELIUM: <0.1 KU/L
E071-IGE MOUSE EPITHELIUM: <0.1 KU/L
G002-IGE BERMUDA GRASS: <0.1 KU/L
G002-IGE BERMUDA GRASS: <0.1 KU/L
G006-IGE TIMOTHY GRASS: <0.1 KU/L
G006-IGE TIMOTHY GRASS: <0.1 KU/L
I006-IGE COCKROACH GERMAN: <0.1 KU/L
I006-IGE COCKROACH GERMAN: <0.1 KU/L
IGE TOTAL: 18 IU/ML
IGE TOTAL: 18 IU/ML
M001-IGE PENICILLIUM CHRYSOGEN: <0.1 KU/L
M001-IGE PENICILLIUM CHRYSOGEN: <0.1 KU/L
M002-IGE CLADOSPORIUM HERBARUM: <0.1 KU/L
M002-IGE CLADOSPORIUM HERBARUM: <0.1 KU/L
M003-IGE ASPERGILLUS FUMIGATUS: <0.1 KU/L
M003-IGE ASPERGILLUS FUMIGATUS: <0.1 KU/L
M004-IGE MUCOR RACEMOSUS: <0.1 KU/L
M004-IGE MUCOR RACEMOSUS: <0.1 KU/L
M006-IGE ALTERNARIA ALTERNATA: <0.1 KU/L
T001-IGE MAPLE/BOX ELDER: <0.1 KU/L
T001-IGE MAPLE/BOX ELDER: <0.1 KU/L
T006-IGE CEDAR MOUNTAIN: <0.1 KU/L
T006-IGE CEDAR MOUNTAIN: <0.1 KU/L
T007-IGE OAK WHITE: <0.1 KU/L
T007-IGE OAK WHITE: <0.1 KU/L
T008-IGE  AMERICAN: <0.1 KU/L
T010-IGE WALNUT: <0.1 KU/L
T010-IGE WALNUT: <0.1 KU/L
T011-IGE MAPLE LEAF: <0.1 KU/L
T014-IGE COTTONWOOD: <0.1 KU/L
T014-IGE COTTONWOOD: <0.1 KU/L
T015-IGE ASH WHITE: <0.1 KU/L
T015-IGE ASH WHITE: <0.1 KU/L
T022-IGE PECAN, HICKORY: <0.1 KU/L
T022-IGE PECAN, HICKORY: <0.1 KU/L
T070-IGE WHITE MULBERRY: <0.1 KU/L
T070-IGE WHITE MULBERRY: <0.1 KU/L
W001-IGE RAGWEED, SHORT: <0.1 KU/L
W001-IGE RAGWEED, SHORT: <0.1 KU/L
W011-IGE THISTLE RUSSIAN: <0.1 KU/L
W011-IGE THISTLE RUSSIAN: <0.1 KU/L
W014-IGE PIGWEED COMMON: <0.1 KU/L
W014-IGE PIGWEED COMMON: <0.1 KU/L
W016-IGE ROUGH MARSHELDER: <0.1 KU/L
W016-IGE ROUGH MARSHELDER: <0.1 KU/L

## 2023-12-08 ENCOUNTER — OFFICE VISIT (OUTPATIENT)
Facility: CLINIC | Age: 63
End: 2023-12-08
Payer: MEDICARE

## 2023-12-08 VITALS
RESPIRATION RATE: 16 BRPM | SYSTOLIC BLOOD PRESSURE: 90 MMHG | HEIGHT: 69 IN | HEART RATE: 67 BPM | DIASTOLIC BLOOD PRESSURE: 58 MMHG | WEIGHT: 172 LBS | BODY MASS INDEX: 25.48 KG/M2 | OXYGEN SATURATION: 98 %

## 2023-12-08 DIAGNOSIS — R05.3 CHRONIC COUGH: Primary | ICD-10-CM

## 2023-12-08 PROCEDURE — 99214 OFFICE O/P EST MOD 30 MIN: CPT | Performed by: INTERNAL MEDICINE

## 2024-01-12 ENCOUNTER — TELEPHONE (OUTPATIENT)
Dept: ORTHOPEDICS CLINIC | Facility: CLINIC | Age: 64
End: 2024-01-12

## 2024-01-12 DIAGNOSIS — M25.512 LEFT SHOULDER PAIN, UNSPECIFIED CHRONICITY: Primary | ICD-10-CM

## 2024-01-12 NOTE — TELEPHONE ENCOUNTER
An X-ray has been ordered and scheduled in accordance with the provider's protocol for the patient's upcoming appointment.

## 2024-01-15 ENCOUNTER — OFFICE VISIT (OUTPATIENT)
Dept: ORTHOPEDICS CLINIC | Facility: CLINIC | Age: 64
End: 2024-01-15
Payer: MEDICARE

## 2024-01-15 ENCOUNTER — HOSPITAL ENCOUNTER (OUTPATIENT)
Dept: GENERAL RADIOLOGY | Age: 64
Discharge: HOME | End: 2024-01-15
Attending: ORTHOPAEDIC SURGERY
Payer: MEDICARE

## 2024-01-15 VITALS — BODY MASS INDEX: 25.18 KG/M2 | HEIGHT: 69 IN | WEIGHT: 170 LBS

## 2024-01-15 DIAGNOSIS — S46.002A INJURY OF LEFT ROTATOR CUFF, INITIAL ENCOUNTER: Primary | ICD-10-CM

## 2024-01-15 DIAGNOSIS — M25.512 LEFT SHOULDER PAIN, UNSPECIFIED CHRONICITY: ICD-10-CM

## 2024-01-15 PROCEDURE — 73030 X-RAY EXAM OF SHOULDER: CPT | Performed by: ORTHOPAEDIC SURGERY

## 2024-01-15 PROCEDURE — 99204 OFFICE O/P NEW MOD 45 MIN: CPT | Performed by: ORTHOPAEDIC SURGERY

## 2024-01-15 NOTE — H&P
Orthopaedic Surgery  [address] [place] [id number]  [phone]     NEW PATIENT VISIT - HISTORY AND PHYSICAL EXAMINATION     Name: Brice Tian   MRN: [id number]  Date: 1/15/2024     CC: Left shoulder pain and weakness    REFERRED BY: Kathy Saldaña MD    HPI:   Brice Tian is a very pleasant 63 year old right-hand dominant male who presents today for evaluation of left shoulder pain ongoing for 1 year, related to bench pressing with too much weight. Pain is up to 5/10 with weakness. Pain worsens with overhead movement, working out, biking, running, bench presses and any pushing motion. He has to modify daily activities. He has not tried any conservative treatment. Pain wakes him up at night and feels like \"knife is stabbing shoulder.\"     He has a notable h/o of a cervical spine C5, 6, and 7 surgery in 2016.    He lives at home with his wife.    PMH:   Past Medical History:   Diagnosis Date    Allergic rhinitis May 2021    Attention deficit disorder without mention of hyperactivity 2011    Glaucoma     Hyperlipidemia 2020    Prostate cancer (HCC)        PAST SURGICAL HX:  Past Surgical History:   Procedure Laterality Date    COLONOSCOPY      OTHER SURGICAL HISTORY  4/30/14    prostate biopsy dr larry     OTHER SURGICAL HISTORY  8/5/14    Seeds        FAMILY HX:  Family History   Problem Relation Age of Onset    Cancer Father     Glaucoma Sister     Glaucoma Mother     Other (west Nile Fever) Mother     Heart Disease Paternal Grandfather     Cancer Brother     Heart Disorder Brother         Passed at age 60 sudden heart attack       ALLERGIES:  Penicillins    MEDICATIONS:   Current Outpatient Medications   Medication Sig Dispense Refill    Budesonide-Formoterol Fumarate (SYMBICORT) 80-4.5 MCG/ACT Inhalation Aerosol Inhale 2 puffs into the lungs 2 (two) times daily. 10.2 g 5    methylPREDNISolone (MEDROL) 4 MG Oral Tablet Therapy Pack As directed. 21 each 0    Levalbuterol Tartrate 45  MCG/ACT Inhalation Aerosol Inhale 1 puff into the lungs every 6 (six) hours as needed for Wheezing or Shortness of Breath. 15 g 0    triamcinolone 0.1 % External Cream Apply topically 2 (two) times daily as needed. 80 g 2    atorvastatin 10 MG Oral Tab Take 1 tablet (10 mg total) by mouth nightly. 90 tablet 1    betamethasone dipropionate 0.05 % External Lotion APPLY TO THE AFFECTED AREA ON SCALP DAILY AS NEEDED FOR FLARES      Tadalafil (CIALIS) 20 MG Oral Tab Take 1 tablet (20 mg total) by mouth as needed for Erectile Dysfunction. 30 tablet 6    RHOPRESSA 0.02 % Ophthalmic Solution Place 1 drop into both eyes at bedtime.      latanoprost 0.005 % Ophthalmic Solution Place 1 drop into both eyes nightly.  3    dorzolamide-timolol 22.3-6.8 MG/ML Ophthalmic Solution Place 1 drop into both eyes 2 (two) times daily.         ROS: A comprehensive 14 point review of systems was performed and was negative aside from the aforementioned per history of present illness.    SOCIAL HX:  Social History     Tobacco Use    Smoking status: Never    Smokeless tobacco: Never    Tobacco comments:     None   Substance Use Topics    Alcohol use: No       PE:   There were no vitals filed for this visit.  Estimated body mass index is 25.4 kg/m² as calculated from the following:    Height as of 12/8/23: 5' 9\" (1.753 m).    Weight as of 12/8/23: 172 lb.    Physical Exam  Constitutional:       Appearance: Normal appearance.   HENT:      Head: Normocephalic and atraumatic.   Eyes:      Extraocular Movements: Extraocular movements intact.   Neck:      Musculoskeletal: Normal range of motion and neck supple.   Cardiovascular:      Pulses: Normal pulses.   Pulmonary:      Effort: Pulmonary effort is normal. No respiratory distress.   Abdominal:      General: There is no distension.   Skin:     General: Skin is warm.      Capillary Refill: Capillary refill takes less than 2 seconds.      Findings: No bruising.   Neurological:      General: No focal  deficit present.      Mental Status: Alert.   Psychiatric:         Mood and Affect: Mood normal.     Examination of the left shoulder demonstrates:   Skin is intact, warm and dry.   Cervical:  Full ROM  Spurling's  Negative    Deformity:   none  Atrophy:   none    Scapular winging: Negative    Palpation:     AC Joint   Negative  Biceps Tendon  Negative  Greater Tuberosity Negative    ROM:   Forward Flexion:  full and symmetric  Abduction:   full and symmetric  External Rotation:  full and symmetric  Internal Rotation:  full and symmetric    Rotator Cuff Strength:   Supraspinatus:   4/5  Subscapularis:   5/5  Infraspinatus/Teres: 5/5    Provocative Tests:   Marcum:   Positive  Speed's:   Positive  Hampton's:   Positive  Lift-off:    Negative  Apprehension:  Negative  Sulcus Sign:   Negative    Neurovascular Upper Extremity (Bilateral)  Motor:    5/5 EPL, Finger Abduction, , Pinch, Deltoid  Sensation:   intact to light touch median, ulnar, radial and axillary nerve  Circulation:   Normal, 2+ radial pulse    The contralateral upper extremity is without limitation in range of motion or strength, no positive provocative maneuvers.       Radiographic Examination/Diagnostics:    Shoulder XR personally viewed, independently interpreted and radiology report was reviewed.    X-ray results indicate no signs of arthritis, fractures, dislocations, or abnormalities.  Well-maintained glenohumeral joint space.      IMPRESSION: Brice Tian is a 63 year old Right hand dominant male with likely left rotator cuff injury sustained 1 year ago while bench pressing with too much weight.     In light of physical findings, we elected to order an MRI to further characterize internal derangement.     PLAN:   We had a detailed discussion outlining the etiology, anatomy, pathophysiology, and natural history of patient's findings. Imaging was reviewed in detail and correlated to a 3-dimensional model of the shoulder.     In light of  the chronicity of symptoms, loss of normal function, and  failure to progress conservatively we recommend an MRI to evaluate the integrity of the patient's findings. The patient will follow up after imaging.   Differential diagnosis includes but not limited to: rotator cuff/labral pathology, impingement, tendinopathy, cartilage injury/loose body, bone marrow edema, and osteoarthritis.     External records were also reviewed for pertinent historical findings contributing to the patients undiagnosed new problem with uncertain prognosis.     The patient had the opportunity to ask questions, and all questions were answered appropriately.      FOLLOW-UP:   Return to clinic after MRI completion.       Tabatha Fair MD  Knee, Shoulder, & Elbow Surgery / Sports Medicine Specialist  Orthopaedic Surgery  [address] [place] [address] [id number] [place] [address].org  [email or web address]  t: [phone]  o: [phone]  f: [phone]    This note was dictated using Dragon software.  While it was briefly proofread prior to completion, some grammatical, spelling, and word choice errors due to dictation may still occur.

## 2024-01-26 ENCOUNTER — OFFICE VISIT (OUTPATIENT)
Dept: ORTHOPEDICS CLINIC | Facility: CLINIC | Age: 64
End: 2024-01-26
Payer: MEDICARE

## 2024-01-26 ENCOUNTER — TELEPHONE (OUTPATIENT)
Dept: ORTHOPEDICS CLINIC | Facility: CLINIC | Age: 64
End: 2024-01-26

## 2024-01-26 ENCOUNTER — PATIENT MESSAGE (OUTPATIENT)
Dept: INTERNAL MEDICINE CLINIC | Facility: CLINIC | Age: 64
End: 2024-01-26

## 2024-01-26 DIAGNOSIS — M75.112 NONTRAUMATIC INCOMPLETE TEAR OF LEFT ROTATOR CUFF: Primary | ICD-10-CM

## 2024-01-26 DIAGNOSIS — M24.112 DEGENERATIVE TEAR OF GLENOID LABRUM OF LEFT SHOULDER: ICD-10-CM

## 2024-01-26 DIAGNOSIS — M19.019 AC JOINT ARTHROPATHY: ICD-10-CM

## 2024-01-26 DIAGNOSIS — M75.42 SUBACROMIAL IMPINGEMENT OF LEFT SHOULDER: ICD-10-CM

## 2024-01-26 DIAGNOSIS — M75.22 BICEPS TENDINITIS OF LEFT UPPER EXTREMITY: ICD-10-CM

## 2024-01-26 PROCEDURE — 99215 OFFICE O/P EST HI 40 MIN: CPT | Performed by: ORTHOPAEDIC SURGERY

## 2024-01-26 NOTE — H&P
Orthopaedic Surgery  [address] [place] [id number]  [phone]     PRE SURGICAL - HISTORY AND PHYSICAL EXAMINATION     Name: Brice Tian   MRN: [id number]  Date: 1/26/2024     CC: Left shoulder pain and weakness in the setting of rotator cuff pathology.     HPI:   Brice Tian is a very pleasant 63 year old right-hand dominant male who presents today for evaluation of MRI scan of the shoulder and discussion regarding definitive management plan.     To summarize: left shoulder pain ongoing for 1 year, related to bench pressing with too much weight. Pain is up to 5/10 with weakness. Pain worsens with overhead movement, working out, biking, running, bench presses and any pushing motion. He has to modify daily activities. He has not tried any conservative treatment. Pain wakes him up at night and feels like \"knife is stabbing shoulder.\"      He has a notable h/o of a cervical spine C5, 6, and 7 surgery in 2016.     He lives at home with his wife.    PMH:   Past Medical History:   Diagnosis Date    Allergic rhinitis May 2021    Attention deficit disorder without mention of hyperactivity 2011    Glaucoma     Hyperlipidemia 2020    Prostate cancer (HCC)        PAST SURGICAL HX:  Past Surgical History:   Procedure Laterality Date    COLONOSCOPY      OTHER SURGICAL HISTORY  4/30/14    prostate biopsy dr larry     OTHER SURGICAL HISTORY  8/5/14    Seeds        FAMILY HX:  Family History   Problem Relation Age of Onset    Cancer Father     Glaucoma Sister     Glaucoma Mother     Other (west Nile Fever) Mother     Heart Disease Paternal Grandfather     Cancer Brother     Heart Disorder Brother         Passed at age 60 sudden heart attack       ALLERGIES:  Penicillins    MEDICATIONS:   Current Outpatient Medications   Medication Sig Dispense Refill    Clobetasol Propionate 0.05 % External Gel Apply 1 Application topically 2 (two) times daily. Apply to scalp bid 30 g 3    Budesonide-Formoterol  Fumarate (SYMBICORT) 80-4.5 MCG/ACT Inhalation Aerosol Inhale 2 puffs into the lungs 2 (two) times daily. 10.2 g 5    Levalbuterol Tartrate 45 MCG/ACT Inhalation Aerosol Inhale 1 puff into the lungs every 6 (six) hours as needed for Wheezing or Shortness of Breath. 15 g 0    triamcinolone 0.1 % External Cream Apply topically 2 (two) times daily as needed. 80 g 2    atorvastatin 10 MG Oral Tab Take 1 tablet (10 mg total) by mouth nightly. 90 tablet 1    betamethasone dipropionate 0.05 % External Lotion APPLY TO THE AFFECTED AREA ON SCALP DAILY AS NEEDED FOR FLARES      Tadalafil (CIALIS) 20 MG Oral Tab Take 1 tablet (20 mg total) by mouth as needed for Erectile Dysfunction. 30 tablet 6    RHOPRESSA 0.02 % Ophthalmic Solution Place 1 drop into both eyes at bedtime.      latanoprost 0.005 % Ophthalmic Solution Place 1 drop into both eyes nightly.  3    dorzolamide-timolol 22.3-6.8 MG/ML Ophthalmic Solution Place 1 drop into both eyes 2 (two) times daily.         ROS: A comprehensive 14 point review of systems was performed and was negative aside from the aforementioned per history of present illness.    SOCIAL HX:  Social History     Tobacco Use    Smoking status: Never    Smokeless tobacco: Never    Tobacco comments:     None   Substance Use Topics    Alcohol use: No       PE:   There were no vitals filed for this visit.  Estimated body mass index is 25.1 kg/m² as calculated from the following:    Height as of 1/15/24: 5' 9\" (1.753 m).    Weight as of 1/15/24: 170 lb (77.1 kg).    Physical Exam  Constitutional:       Appearance: Normal appearance.   HENT:      Head: Normocephalic and atraumatic.   Eyes:      Extraocular Movements: Extraocular movements intact.   Neck:      Musculoskeletal: Normal range of motion and neck supple.   Cardiovascular:      Pulses: Normal pulses.   Pulmonary:      Effort: Pulmonary effort is normal. No respiratory distress.   Abdominal:      General: There is no distension.   Skin:      General: Skin is warm.      Capillary Refill: Capillary refill takes less than 2 seconds.      Findings: No bruising.   Neurological:      General: No focal deficit present.      Mental Status: Alert.   Psychiatric:         Mood and Affect: Mood normal.     Examination of the left shoulder demonstrates:     Skin is intact, warm and dry.   Cervical:  Full ROM  Spurling's  Negative    Deformity:   none  Atrophy:   none    Scapular winging: Negative    Palpation:     AC Joint   Positive  Biceps Tendon  Positive  Greater Tuberosity Negative    ROM:   Forward Flexion:  full and symmetric  Abduction:   full and symmetric  External Rotation:  full and symmetric  Internal Rotation:  full and symmetric    Rotator Cuff Strength:   Supraspinatus:   4/5  Subscapularis:   5/5  Infraspinatus/Teres: 5/5    Provocative Tests:   Marcum:   Positive  Speed's:   Positive  Willis's:   Positive  Lift-off:    Negative  Apprehension:  Negative  Sulcus Sign:   Negative    Neurovascular Upper Extremity (Bilateral)  Motor:    5/5 EPL, Finger Abduction, , Pinch, Deltoid  Sensation:   intact to light touch median, ulnar, radial and axillary nerve  Circulation:   Normal, 2+ radial pulse    The contralateral upper extremity is without limitation in range of motion or strength, no positive provocative maneuvers.     Radiographic Examination/Diagnostics:    XR and MRI of the shoulder personally viewed, independently interpreted and radiology report was reviewed.    MRI SHOULDER, LEFT (CPT=73221)    Result Date: 1/23/2024  Exam: MRI SHOULDER LT W/O CONTRAST CPT Code(s): 66060 - MRI JOINT UPR EXTREM W/O DYE CLINICAL HISTORY: Shoulder pain, weakness and limited range of motion. TECHNIQUE: Non-infused, multiplanar and multi-sequential ultrahigh field 3.0 Mary Ellen MRI of the left shoulder was performed. COMPARISON: None. FINDINGS: There is an intermediate to high-grade partial thickness articular surface tear involving the anterior central fibers of  the supraspinatus tendon with approximately 2 cm retraction of some of the torn articular surface fibers. There is heterogeneous increased signal, some of which is of fluid signal intensity, in the distal subscapularis tendon superiorly. No full-thickness rotator cuff tear is identified. There is trace fluid in the subacromial/subdeltoid bursa with mild to moderate fluid in the subcoracoid bursa. There is somewhat ill-defined longitudinally oriented partial tearing of the intra-articular and proximal extra-articular segments of the biceps tendon long head. There is subluxation of the biceps tendon partially out of the bicipital groove and into the substance of the subscapularis tendon distally and superiorly. Abnormal heterogeneous ill-defined increased signal is seen within the superior labrum. There is irregular linear signal extending along the base of the posterosuperior labrum to the level of the equator. Punctate foci of fluid abuts the posterosuperior and posteroinferior labrum. There is no joint effusion. The glenohumeral joint space is minimally narrowed. There is mild degenerative change at the acromioclavicular joint and a type I anterior acromion process. No fracture or focal bone lesion is identified. A small cyst is noted  in the humerus lesser tuberosity. There is subtle fatty atrophy of the teres minor muscle. The quadrilateral space appears clear.     IMPRESSION: 1. Intermediate to high-grade partial thickness articular surface supraspinatus tendon tear with mild retraction of the torn tendon margin. There is interstitial tearing and tendinosis of the subscapularis tendon distally. 2. Irregular longitudinal partial tearing of the biceps tendon long head. There is subluxation of the biceps tendon long head partially out of the bicipital groove and into the substance of the torn subscapularis tendon. 3. Degenerative tearing of the superior/posterosuperior labrum. 4. There is subtle fatty atrophy of  the teres minor muscle. This can be seen with a denervation type process. The quadrilateral space appears clear, however. This report was performed utilizing speech recognition software technology. Despite thorough proofreading, speech recognition errors could escape detection. If a word or phrase is confusing or out of context, please do not hesitate to call for clarification. Interpreting Radiologist: Montez Maurer M.D. Electronically Signed: 01/23/2024 05:37 PM    XR SHOULDER, COMPLETE (MIN 2 VIEWS), LEFT (CPT=73030)    Result Date: 1/15/2024            PROCEDURE:  XR SHOULDER, COMPLETE (MIN 2 VIEWS), LEFT (CPT=73030)  TECHNIQUE:  Multiple views were obtained.  COMPARISON:  [facility], XR, XR SHOULDER, COMPLETE (MIN 2 VIEWS), LEFT (CPT=73030), 2/18/2023, 10:28 AM.  INDICATIONS:  M25.512 Left shoulder pain, unspecified chronicity  PATIENT STATED HISTORY: (As transcribed by Technologist)  Ortho consult. Patient states left shoulder pain for 1 month.    FINDINGS: No fracture or dislocation. Appropriate internal and external rotation is demonstrated. Left glenohumeral joint space is well maintained.  Subacromial osteophytes noted.  Minimal hypertrophic changes of the left acromioclavicular joint.   IMPRESSION: Mild osteoarthritic changes in the left shoulder.  Subacromial osteophytes noted.  Possibility of impingement cannot be excluded.   LOCATION:  [facility]   Dictated by (CST): Jose Daniel Lynch MD on 1/15/2024 at 1:52 PM     Finalized by (CST): Jose Daniel Lynch MD on 1/15/2024 at 1:52 PM         IMPRESSION: Brice Tian is a 63 year old male with Left shoulder high-grade partial-thickness articular sided supraspinatus tendon tear with retraction of the tendon margin as well as interstitial tearing of the subscapularis tendon distally.  Interstitial tearing long of the biceps with subluxation into the groove, degenerative glenoid labrum tearing, subacromial impingement and AC joint arthropathy.    The patient  has failed an appropriate course of nonsurgical conservative management and is a candidate for arthroscopic rotator cuff repair, subacromial decompression, biceps tenodesis, and distal clavicle excision.    PLAN:   We had a detailed discussion outlining the etiology, anatomy, pathophysiology, and natural history of rotator cuff pathology of the shoulder. Imaging was reviewed in detail and correlated to a 3-dimensional model of the shoulder.     I had a lengthy discussion with Brice about the diagnosis and options, both surgical and nonsurgical. I have recommended that we proceed with arthroscopic rotator cuff repair and likely biceps tenodesis as we agree surgical intervention would likely offer the best opportunity for symptomatic relief and functional recovery. I used imaging studies and a 3-dimensional model to outline his pathology, as well as general surgical principles. We reviewed the risks associated with shoulder arthroscopy.   In particular we discussed risks that include, but are not limited to infection, blood loss, potential transient or permanent injury to nerves or blood vessels, joint stiffness, persistent pain, need for future operation, failure of healing, wound complications, failure of therapeutic intervention, risk of re-injury, fixation failure, deep vein thrombosis and pulmonary embolism. We discussed the proposed rehabilitation timeline as well as expected postoperative restrictions.     Most post-surgical patients after rotator cuff repair utilize a shoulder immobilizer/sling for approximately ~4 weeks.  Physical therapy is initiated immediately postsurgically with initial passive range of motion, followed by active assisted range of motion, and ultimately active range of motion after the 6 to 8-week clemencia.  After the 3-month clemencia postsurgically the restrictions are lifted and continued strengthening is recommended.    Brice voiced a good understanding of treatment options, risks and  benefits, postoperative instructions, rehabilitation timeline, and restrictions. He was given the opportunity to ask questions, which were all answered to the best of my ability and to his satisfaction. Brice will work with my office to arrange a time for surgery and obtain any medical clearance information necessary. My pre-operative information packet, which details the process and answers many FAQ's will be provided. He was encouraged to call the office with any further questions or concerns.    I spent 60 minutes in preparation to see the patient, counseling/education of relevant pathology, discussing imaging results, surgical counseling, DME fitting, and care coordination.      FOLLOW-UP:   Post-Operative Visit, POD 6 with ROSA Felipe MD  Knee, Shoulder, & Elbow Surgery / Sports Medicine Specialist  Orthopaedic Surgery  [address] [place] [address].org  [email or web address]  t: [phone]  o: [phone]  f: [phone]    This note was dictated using Dragon software.  While it was briefly proofread prior to completion, some grammatical, spelling, and word choice errors due to dictation may still occur.

## 2024-01-26 NOTE — TELEPHONE ENCOUNTER
Date of Surgery: [facility] 2024       Post Op Appt:  2024 1030AM    Case ID: [id number]     Notes: Lets add for 24. Thanks!                      OR BOOKING SHEET SHOULDER  Location: [] [facility]                    [x] [facility]  Name: Brice Tian  MRN: [id number]   : 4/10/1960  Diagnos  [x] Nontraumatic incomplete tear of left rotator cuff [M75.112]  Disposition:    [x] Ambulatory  [] Overnight for EDWINA  [] Overnight for observation and pain control  [] Inpatient procedure     Operative Time Required:  2 hours   Antibiotics: 2 g cefazolin within 60 minutes of surgical incision  Procedure:   Laterality:                  [] RIGHT                  [x] LEFT                   [] BILATERAL  Procedures:                    [x] Shoulder Arthroscopy                                 [x] Rotator Cuff Repair (42715)  [x] Arthroscopic Biceps Tenodesis (40549)  [x] Subacromial Decompression (15707)  [x] Distal Clavicle Excision (34824)     [] SLAP repair (96458)  [] Capsulorraphy / Bankart (81417)     Additional info:   [x] PCP Clearance Needed  [] MRSA  [] C-Arm  [x] TXA at time surgery  [x] Physical Therapy External - Gertrudis Ferguson  [x] DME Rx Needed  [] Appt with Dr. Fair needed  Implants needed: Arthrex, Trenary  Positioning Equipment: Beach Chair Setup, Ritika

## 2024-01-26 NOTE — PROGRESS NOTES
OR BOOKING SHEET SHOULDER  Location: [] [facility]   [x] [facility]  Name: Brice Tian  MRN: [id number]   : 4/10/1960  Diagnos  [x] Nontraumatic incomplete tear of left rotator cuff [M75.112]  Disposition:    [x] Ambulatory  [] Overnight for EDWINA  [] Overnight for observation and pain control  [] Inpatient procedure    Operative Time Required:  2 hours   Antibiotics: 2 g cefazolin within 60 minutes of surgical incision  Procedure:   Laterality: [] RIGHT [x] LEFT                  [] BILATERAL  Procedures:   [x] Shoulder Arthroscopy    [x] Rotator Cuff Repair (88784)  [x] Arthroscopic Biceps Tenodesis (31673)  [x] Subacromial Decompression (19177)  [x] Distal Clavicle Excision (21519)    [] SLAP repair (82817)  [] Capsulorraphy / Bankart (30609)    Additional info:   [x] PCP Clearance Needed  [] MRSA  [] C-Arm  [x] TXA at time surgery  [x] Physical Therapy External - Gertrudis Ferguson  [x] DME Rx Needed  [] Appt with Dr. Fair needed  Implants needed: Arthrex, Spearfish  Positioning Equipment: Beach Chair Setup, Ritika

## 2024-01-26 NOTE — TELEPHONE ENCOUNTER
From: Brice Tian  To: Kathy Saldaña  Sent: 1/26/2024 1:56 PM CST  Subject: Other    Hello Dr.  I made an apt to have surgery on my troubling left shoulder for Feb 8. They would like me to have a pre op physical. I signed in and took your earliest apt Feb 6. Not sure if that gives them enough time. Is there any way you could get me in sometime next week? Overall I feel great and have no concerns.  Thanks,  Asia

## 2024-01-26 NOTE — TELEPHONE ENCOUNTER
CALLED PATIENT AND WE SCHEDULED SURGERY, POST OP AND WENT OVER PRE OPERATIVE PROCEDURES. PCP CLEARANCE SENT AND ALL QUESTIONS ANSWERED.

## 2024-01-26 NOTE — TELEPHONE ENCOUNTER
Ok to keep pre-op appointment for 2/6 - since they just need a note that should be done by end of the day 2/6.  However if he is told about other pre-op testing requirements (labs, EKG) he should let us know/he can do those ahead of time at the lab.

## 2024-02-01 ENCOUNTER — OFFICE VISIT (OUTPATIENT)
Dept: INTERNAL MEDICINE CLINIC | Facility: CLINIC | Age: 64
End: 2024-02-01
Payer: MEDICARE

## 2024-02-01 VITALS
RESPIRATION RATE: 12 BRPM | HEIGHT: 68.5 IN | WEIGHT: 174.19 LBS | SYSTOLIC BLOOD PRESSURE: 111 MMHG | DIASTOLIC BLOOD PRESSURE: 63 MMHG | OXYGEN SATURATION: 99 % | TEMPERATURE: 98 F | HEART RATE: 65 BPM | BODY MASS INDEX: 26.1 KG/M2

## 2024-02-01 DIAGNOSIS — H40.1133 PRIMARY OPEN ANGLE GLAUCOMA (POAG) OF BOTH EYES, SEVERE STAGE: ICD-10-CM

## 2024-02-01 DIAGNOSIS — I25.84 CORONARY ARTERY CALCIFICATION: ICD-10-CM

## 2024-02-01 DIAGNOSIS — Z01.818 PREOPERATIVE EXAMINATION: Primary | ICD-10-CM

## 2024-02-01 DIAGNOSIS — Z85.46 HISTORY OF PROSTATE CANCER: ICD-10-CM

## 2024-02-01 DIAGNOSIS — R05.3 CHRONIC COUGH: ICD-10-CM

## 2024-02-01 DIAGNOSIS — Z86.16 HISTORY OF COVID-19: ICD-10-CM

## 2024-02-01 DIAGNOSIS — I25.10 CORONARY ARTERY CALCIFICATION: ICD-10-CM

## 2024-02-01 DIAGNOSIS — M75.112 NONTRAUMATIC INCOMPLETE TEAR OF LEFT ROTATOR CUFF: ICD-10-CM

## 2024-02-01 DIAGNOSIS — E78.00 PURE HYPERCHOLESTEROLEMIA: ICD-10-CM

## 2024-02-01 PROCEDURE — 99214 OFFICE O/P EST MOD 30 MIN: CPT | Performed by: INTERNAL MEDICINE

## 2024-02-01 NOTE — PROGRESS NOTES
Brice Tian is a 63 year old male who presents for a pre-operative physical exam.   Brice Tian is scheduled for a left rotator cuff procedure at [facility] on 2/8/24 performed by Dr. CORTNEY Fair, who has requested that I provide pre-operative consultation before this procedure. Indication: Nontraumatic incomplete tear of left rotator cuff, Pure hypercholesterolemia, Coronary artery calcification, Chronic cough, History of prostate cancer, and Primary open angle glaucoma (POAG) of both eyes, severe stage were also pertinent to this visit.  HPI related to surgery:   He  has had previous anesthesia:  Yes.  Previous complications:  No  Patient has good functional status (>4 METS).  No active anginal symptoms, no history of arrhythmias, coronary artery disease, valvular disease.    He has been dealing with left shoulder pain for over a year.  Failed conservative therapy.  MRI showed partial rotator cuff tear.  Patient will now be having arthroscopic surgery.      Pertinent chronic medical issues:  Hypercholesterolemia, coronary artery calcification - on aspirin therapy, holding it currently with upcoming surgery.  Chronic cough, history of COVID-19 - saw Pulmonology; started on Symbicort inhaler; doing great after that.  Hx of prostate cancer - follows annually with Urology, no evidence of disease of late.  Glaucoma - on prescription eye drops, follows regularly with Ophthalmology.    REVIEW OF SYSTEMS:   GENERAL/ const: no fevers/chills. feels well, no weight loss  SKIN: denies any unusual skin lesions  EYES:no vision problems  HEENT: denies sinus pain or sinus tenderness  LUNGS: denies shortness of breath   CARDIOVASCULAR: denies chest pain  GI: denies nausea/emesis/ abdominal pain diarrhea constipation  : denies dysuria   MUSCULOSKELETAL: left shoulder pain  NEURO: denies headaches  PSYCHIATRIC: denies issues  ENDOCRINE: no hot/cold intolerance  ALLERGY:   Allergies   Allergen Reactions     Penicillins SWELLING     PAST HISTORY:     Current Outpatient Medications:     Clobetasol Propionate 0.05 % External Gel, Apply 1 Application topically 2 (two) times daily. Apply to scalp bid, Disp: 30 g, Rfl: 3    Budesonide-Formoterol Fumarate (SYMBICORT) 80-4.5 MCG/ACT Inhalation Aerosol, Inhale 2 puffs into the lungs 2 (two) times daily., Disp: 10.2 g, Rfl: 5    Levalbuterol Tartrate 45 MCG/ACT Inhalation Aerosol, Inhale 1 puff into the lungs every 6 (six) hours as needed for Wheezing or Shortness of Breath., Disp: 15 g, Rfl: 0    triamcinolone 0.1 % External Cream, Apply topically 2 (two) times daily as needed., Disp: 80 g, Rfl: 2    atorvastatin 10 MG Oral Tab, Take 1 tablet (10 mg total) by mouth nightly., Disp: 90 tablet, Rfl: 1    betamethasone dipropionate 0.05 % External Lotion, APPLY TO THE AFFECTED AREA ON SCALP DAILY AS NEEDED FOR FLARES, Disp: , Rfl:     Tadalafil (CIALIS) 20 MG Oral Tab, Take 1 tablet (20 mg total) by mouth as needed for Erectile Dysfunction., Disp: 30 tablet, Rfl: 6    RHOPRESSA 0.02 % Ophthalmic Solution, Place 1 drop into both eyes at bedtime., Disp: , Rfl:     latanoprost 0.005 % Ophthalmic Solution, Place 1 drop into both eyes nightly., Disp: , Rfl: 3    dorzolamide-timolol 22.3-6.8 MG/ML Ophthalmic Solution, Place 1 drop into both eyes 2 (two) times daily., Disp: , Rfl:   Medical:  has a past medical history of Allergic rhinitis (May 2021), Attention deficit disorder without mention of hyperactivity (2011), Glaucoma, Hyperlipidemia (2020), and Prostate cancer (HCC).  Surgical:  has a past surgical history that includes other surgical history (4/30/14); other surgical history (8/5/14); and colonoscopy.  Family: family history includes Cancer in his brother and father; Glaucoma in his mother and sister; Heart Disease in his paternal grandfather; Heart Disorder in his brother; west Nile Fever in his mother.  Social:   Social History     Socioeconomic History    Marital status:     Tobacco Use    Smoking status: Never    Smokeless tobacco: Never    Tobacco comments:     None   Vaping Use    Vaping Use: Never used   Substance and Sexual Activity    Alcohol use: No    Drug use: Never    Sexual activity: Yes   Other Topics Concern    Caffeine Concern Yes     Comment: 3-4 cups of coffee daily    Stress Concern No    Weight Concern No    Special Diet No    Exercise Yes     Comment: 4-5x/week    Seat Belt Yes     PHYSICAL EXAM:   Temp 97.8 °F (36.6 °C) (Temporal)   Ht 5' 8.5\" (1.74 m)   Wt 174 lb 3.2 oz (79 kg)   BMI 26.10 kg/m²    GENERAL: Alert and oriented, well developed, well nourished,in no apparent distress  SKIN: no rashes,no suspicious lesions  HEENT: atraumatic, PERRLA, EOMI, normal lid and conjunctiva  NECK: supple, no jvd, no thyromegaly  LUNGS: clear to auscultation bilaterally, no wheezing/rubs  CARDIO: RRR without murmurs.  No clubbing, cyanosis or edema.  GI: soft non tender nondistended no hepatosplenomegaly, bowel sounds throughout  NEURO: CN II-XII intact  PSYCH: pleasant, appropriate mood and affect  LABORATORY DATA:   N/A  ASSESSMENT AND PLAN:   1.  Pre-operative examination  Patient presents for pre-operative examination at the request of performing surgeon.  Patient has good functional status (>4 METS).  No active anginal symptoms, no history of arrhythmias, coronary artery disease, valvular disease.  RCRI score of 0,  Class I risk, 3.9% risk of major cardiac event.  He is considered an intermediate risk patient undergoing an intermediate risk procedure, and is ok to proceed with surgery.  Note and pertinent pre-operative testing results will be faxed to referring surgeon's office and/or surgical center as requested.    2. Nontraumatic incomplete tear of left rotator cuff  To have arthroscopic surgery as above.    3. Pure hypercholesterolemia  4. Coronary artery calcification  Very minimal coronary artery calcification on heart scan from last year.  He elects to  take baby aspirin for now and hold off on statin.  He is holding his aspirin right now perioperatively.    5. Chronic cough  6. History of COVID-19  Patient had a lingering cough after having COVID last year.  Saw Pulmonology, started on Symbicort - this has helped a lot.  Lungs clear to auscultation today.  Continue with Symbicort therapy.  Ok for surgery.    7. History of prostate cancer  Follows annually with Urology.  No evidence of disease per last note.    8. Primary open angle glaucoma (POAG) of both eyes, severe stage  Follows regularly with Ophthalmology ([facility]) - on prescription eye drops.    Patient Care Team:  Kathy Saldaña MD as PCP - General (Internal Medicine)  Maurice Ferguson MD as Consulting Physician (UROLOGY)  Melo Washington as Consulting Physician (OPHTHALMOLOGY)  Harsh Garcia MD as Consulting Physician (GASTROENTEROLOGY)  The patient indicates understanding of these issues and agrees to the plan.  The patient is asked to return to clinic in 3-6 months for follow up on chronic issues/Medicare Wellness Visit, or earlier if acute issues arise.  Kathy Saldaña MD

## 2024-02-01 NOTE — PATIENT INSTRUCTIONS
- Ok for surgery from my perspective  - Should be fine to resume aspirin 1-2 days after surgery  - Continue inhaler therapy  - Follow up with me spring/Summer (March-August) for you Annual Wellness Visit; follow up earlier as needed.    It was a pleasure seeing you in the clinic today.  Thank you for choosing the [facility] office for your healthcare needs. Please call at [phone] with any questions or concerns.    Kathy Saldaña MD

## 2024-02-06 DIAGNOSIS — Z98.890 S/P ARTHROSCOPY OF SHOULDER: Primary | ICD-10-CM

## 2024-02-06 RX ORDER — ONDANSETRON 4 MG/1
TABLET, FILM COATED ORAL
Qty: 8 TABLET | Refills: 0 | Status: SHIPPED | OUTPATIENT
Start: 2024-02-06

## 2024-02-06 RX ORDER — TRAMADOL HYDROCHLORIDE 50 MG/1
TABLET ORAL
Qty: 20 TABLET | Refills: 1 | Status: SHIPPED | OUTPATIENT
Start: 2024-02-06

## 2024-02-14 ENCOUNTER — OFFICE VISIT (OUTPATIENT)
Dept: ORTHOPEDICS CLINIC | Facility: CLINIC | Age: 64
End: 2024-02-14
Payer: MEDICARE

## 2024-02-14 ENCOUNTER — OFFICE VISIT (OUTPATIENT)
Facility: CLINIC | Age: 64
End: 2024-02-14
Payer: MEDICARE

## 2024-02-14 VITALS
OXYGEN SATURATION: 98 % | RESPIRATION RATE: 16 BRPM | DIASTOLIC BLOOD PRESSURE: 62 MMHG | HEART RATE: 62 BPM | HEIGHT: 69 IN | WEIGHT: 171 LBS | BODY MASS INDEX: 25.33 KG/M2 | SYSTOLIC BLOOD PRESSURE: 114 MMHG

## 2024-02-14 DIAGNOSIS — R06.02 SHORTNESS OF BREATH: Primary | ICD-10-CM

## 2024-02-14 DIAGNOSIS — Z98.890 S/P ARTHROSCOPY OF SHOULDER: Primary | ICD-10-CM

## 2024-02-14 DIAGNOSIS — J45.20 MILD INTERMITTENT ASTHMA WITHOUT COMPLICATION: ICD-10-CM

## 2024-02-14 PROCEDURE — 99213 OFFICE O/P EST LOW 20 MIN: CPT | Performed by: INTERNAL MEDICINE

## 2024-02-14 PROCEDURE — 99024 POSTOP FOLLOW-UP VISIT: CPT | Performed by: PHYSICIAN ASSISTANT

## 2024-02-14 RX ORDER — PSEUDOEPHED/ACETAMINOPH/DIPHEN 30MG-500MG
TABLET ORAL
COMMUNITY
Start: 2024-02-06

## 2024-02-14 NOTE — PROGRESS NOTES
[facility]  [address] [place] [id number]  [phone]       Name: Brice Tian   MRN: [id number]  Date: 2/14/2024     REASON FOR VISIT: First Post-Surgical Visit   Surgery: Left shoulder scope, rotator cuff repair, biceps tenodesis, subacromial decompression, distal clavicle on 02/08/2024.    INTERVAL HISTORY:  Brice Tian is a 63 year old male who returns after the aforementioned procedure.  The post-operative course has been unremarkable with pain well controlled and overall progress noted.     Physical therapy was started and is progressing well.  The patient denies any calf pain or tenderness, fevers, chills, sweats or signs of active infection. The patient has been compliant with the postoperative protocol, and admits to normal bowel and bladder function. No acute issues.     ROS: ROS    PE:   There were no vitals filed for this visit.  Estimated body mass index is 26.1 kg/m² as calculated from the following:    Height as of 2/1/24: 5' 8.5\" (1.74 m).    Weight as of 2/1/24: 174 lb 3.2 oz (79 kg).    Physical Exam  Constitutional:       Appearance: Normal appearance.   HENT:      Head: Normocephalic and atraumatic.   Eyes:      Extraocular Movements: Extraocular movements intact.   Neck:      Musculoskeletal: Normal range of motion and neck supple.   Cardiovascular:      Pulses: Normal pulses.   Pulmonary:      Effort: Pulmonary effort is normal. No respiratory distress.   Abdominal:      General: There is no distension.   Skin:     General: Skin is warm.      Capillary Refill: Capillary refill takes less than 2 seconds.      Findings: No bruising.   Neurological:      General: No focal deficit present.      Mental Status: She is alert.   Psychiatric:         Mood and Affect: Mood normal.     Examination of the right shoulder demonstrates:     Physical examination the patient is alert and oriented x3, well-developed, well-nourished, no acute  distress.     Tegaderm dressings were removed, and Steri-Strips were maintained and kept in place.    Incisional sites are clean dry intact without signs of active pathology.      The contralateral shoulder is without limitation in range of motion or strength, no positive provocative maneuvers.       Radiographic Examination/Diagnostics:    I personally viewed, independently interpreted and radiology report was reviewed.      MRI SHOULDER, LEFT (CPT=73221)    Result Date: 1/23/2024  Exam: MRI SHOULDER LT W/O CONTRAST CPT Code(s): 02223 - MRI JOINT UPR EXTREM W/O DYE CLINICAL HISTORY: Shoulder pain, weakness and limited range of motion. TECHNIQUE: Non-infused, multiplanar and multi-sequential ultrahigh field 3.0 Mary Ellen MRI of the left shoulder was performed. COMPARISON: None. FINDINGS: There is an intermediate to high-grade partial thickness articular surface tear involving the anterior central fibers of the supraspinatus tendon with approximately 2 cm retraction of some of the torn articular surface fibers. There is heterogeneous increased signal, some of which is of fluid signal intensity, in the distal subscapularis tendon superiorly. No full-thickness rotator cuff tear is identified. There is trace fluid in the subacromial/subdeltoid bursa with mild to moderate fluid in the subcoracoid bursa. There is somewhat ill-defined longitudinally oriented partial tearing of the intra-articular and proximal extra-articular segments of the biceps tendon long head. There is subluxation of the biceps tendon partially out of the bicipital groove and into the substance of the subscapularis tendon distally and superiorly. Abnormal heterogeneous ill-defined increased signal is seen within the superior labrum. There is irregular linear signal extending along the base of the posterosuperior labrum to the level of the equator. Punctate foci of fluid abuts the posterosuperior and posteroinferior labrum. There is no joint effusion. The  glenohumeral joint space is minimally narrowed. There is mild degenerative change at the acromioclavicular joint and a type I anterior acromion process. No fracture or focal bone lesion is identified. A small cyst is noted  in the humerus lesser tuberosity. There is subtle fatty atrophy of the teres minor muscle. The quadrilateral space appears clear. IMPRESSION: 1. Intermediate to high-grade partial thickness articular surface supraspinatus tendon tear with mild retraction of the torn tendon margin. There is interstitial tearing and tendinosis of the subscapularis tendon distally. 2. Irregular longitudinal partial tearing of the biceps tendon long head. There is subluxation of the biceps tendon long head partially out of the bicipital groove and into the substance of the torn subscapularis tendon. 3. Degenerative tearing of the superior/posterosuperior labrum. 4. There is subtle fatty atrophy of the teres minor muscle. This can be seen with a denervation type process. The quadrilateral space appears clear, however. This report was performed utilizing speech recognition software technology. Despite thorough proofreading, speech recognition errors could escape detection. If a word or phrase is confusing or out of context, please do not hesitate to call for clarification. Interpreting Radiologist: Montez Maurer M.D. Electronically Signed: 01/23/2024 05:37 PM    XR SHOULDER, COMPLETE (MIN 2 VIEWS), LEFT (CPT=73030)    Result Date: 1/15/2024            PROCEDURE:  XR SHOULDER, COMPLETE (MIN 2 VIEWS), LEFT (CPT=73030)  TECHNIQUE:  Multiple views were obtained.  COMPARISON:  [facility], XR, XR SHOULDER, COMPLETE (MIN 2 VIEWS), LEFT (CPT=73030), 2/18/2023, 10:28 AM.  INDICATIONS:  M25.512 Left shoulder pain, unspecified chronicity  PATIENT STATED HISTORY: (As transcribed by Technologist)  Ortho consult. Patient states left shoulder pain for 1 month.    FINDINGS: No fracture or dislocation. Appropriate internal and external  rotation is demonstrated. Left glenohumeral joint space is well maintained.  Subacromial osteophytes noted.  Minimal hypertrophic changes of the left acromioclavicular joint.   IMPRESSION: Mild osteoarthritic changes in the left shoulder.  Subacromial osteophytes noted.  Possibility of impingement cannot be excluded.   LOCATION:  [facility]   Dictated by (CST): Jose Daniel Lynch MD on 1/15/2024 at 1:52 PM     Finalized by (CST): Jose Daniel Lynch MD on 1/15/2024 at 1:52 PM         IMPRESSION: Brice Tian is a 63 year old male who presents 6 days s/p Left shoulder scope, rotator cuff repair, biceps tenodesis, subacromial decompression, distal clavicle on 02/08/2024.    PLAN:   We had a lengthy discussion with the patient regarding the patient's findings consistent with the expected postoperative course. We recommend continuation of physical therapy with rehabilitation efforts focused on strengthening, range of motion, functional ability, and return to baseline activity. The patient can continue to progress per protocol.    All questions were answered appropriately and the patient was in agreement with the treatment plan.       FOLLOW-UP:  Return to clinic on an as needed basis.             Sandy Reeves, [facility], PA-C Orthopedic Surgery / Sports Medicine Specialist  [facility]  [address] [place] [address] [id number] [place] [email or web address]  [email or web address]  t: [phone]  o: [phone]  f: [phone]    This note was dictated using Dragon software.  While it was briefly proofread prior to completion, some grammatical, spelling, and word choice errors due to dictation may still occur.

## 2024-02-14 NOTE — PROGRESS NOTES
[facility] Follow Up Note    History of Present Illness:  Brice Tian is a 63 year old male who presents today for follow up of chronic cough/post covid reactive airways disease.  They were last seen on 12/8.    Since last visit, patients breathing is in excellent shape.  Using symbicort as needed and getting good benefit from it.        Past Medical History:   Past Medical History:   Diagnosis Date    Allergic rhinitis May 2021    Attention deficit disorder without mention of hyperactivity 2011    Glaucoma     Hyperlipidemia 2020    Prostate cancer (HCC)         Past Surgical History:   Past Surgical History:   Procedure Laterality Date    COLONOSCOPY      OTHER SURGICAL HISTORY  4/30/14    prostate biopsy dr larry     OTHER SURGICAL HISTORY  8/5/14    Seeds        Family Medical History:   Family History   Problem Relation Age of Onset    Cancer Father     Glaucoma Sister     Glaucoma Mother     Other (west Nile Fever) Mother     Heart Disease Paternal Grandfather     Cancer Brother     Heart Disorder Brother         Passed at age 60 sudden heart attack        Social History:   Social History     Socioeconomic History    Marital status:      Spouse name: Not on file    Number of children: Not on file    Years of education: Not on file    Highest education level: Not on file   Occupational History    Not on file   Tobacco Use    Smoking status: Never    Smokeless tobacco: Never    Tobacco comments:     None   Vaping Use    Vaping Use: Never used   Substance and Sexual Activity    Alcohol use: No    Drug use: Never    Sexual activity: Yes   Other Topics Concern     Service Not Asked    Blood Transfusions Not Asked    Caffeine Concern Yes     Comment: 3-4 cups of coffee daily    Occupational Exposure Not Asked    Hobby Hazards Not Asked    Sleep Concern Not Asked    Stress Concern No    Weight Concern No    Special Diet No    Back Care Not Asked    Exercise Yes     Comment: 4-5x/week    Bike  Helmet Not Asked    Seat Belt Yes    Self-Exams Not Asked   Social History Narrative    Not on file     Social Determinants of Health     Financial Resource Strain: Not on file   Food Insecurity: Not on file   Transportation Needs: Not on file   Physical Activity: Not on file   Stress: Not on file   Social Connections: Not on file   Housing Stability: Not on file        Medications:   Current Outpatient Medications   Medication Sig Dispense Refill    Acetaminophen Extra Strength 500 MG Oral Tab TAKE TWO CAPLETS EVERY 6 HOURS FOR 14 DAYS      Acetaminophen 500 MG Oral Cap Take 2 capsules (1,000 mg total) by mouth every 6 (six) hours for 14 days. 50 capsule 1    traMADol 50 MG Oral Tab Please take 1 to 2 tablets every 4 hours as needed for pain. 20 tablet 1    Sennosides-Docusate Sodium 8.6-50 MG Oral Cap Take 1 capsule by mouth daily as needed. 30 capsule 1    ondansetron (ZOFRAN) 4 mg tablet Take 1 tablet by mouth every 8 hours as needed for nausea. 8 tablet 0    Clobetasol Propionate 0.05 % External Gel Apply 1 Application topically 2 (two) times daily. Apply to scalp bid 30 g 3    Budesonide-Formoterol Fumarate (SYMBICORT) 80-4.5 MCG/ACT Inhalation Aerosol Inhale 2 puffs into the lungs 2 (two) times daily. 10.2 g 5    triamcinolone 0.1 % External Cream Apply topically 2 (two) times daily as needed. 80 g 2    betamethasone dipropionate 0.05 % External Lotion APPLY TO THE AFFECTED AREA ON SCALP DAILY AS NEEDED FOR FLARES      Tadalafil (CIALIS) 20 MG Oral Tab Take 1 tablet (20 mg total) by mouth as needed for Erectile Dysfunction. 30 tablet 6    RHOPRESSA 0.02 % Ophthalmic Solution Place 1 drop into both eyes at bedtime.      latanoprost 0.005 % Ophthalmic Solution Place 1 drop into both eyes nightly.  3    dorzolamide-timolol 22.3-6.8 MG/ML Ophthalmic Solution Place 1 drop into both eyes 2 (two) times daily.         Review of Systems: Review of Systems     Physical Exam:  /62 (BP Location: Right arm, Patient  Position: Sitting, Cuff Size: adult)   Pulse 62   Resp 16   Ht 5' 9\" (1.753 m)   Wt 171 lb (77.6 kg)   SpO2 98%   BMI 25.25 kg/m²      Constitutional: alert, cooperative. No acute distress.  HEENT: Head NC/AT.   Cardio: Regular rate and rhythm. Normal S1 and S2. No murmurs.   Respiratory: Thorax symmetrical with no labored breathing. Clear to ausculation bilaterally with symmetrical breath sounds. No wheezing, rhonchi, rales, or crackles.   GI: NABS. Abd soft, non-tender.  Extremities: No clubbing or cyanosis. No BLE edema.    Neurologic: A&Ox3. No gross motor deficits.  Skin: Warm, dry  Psych: Calm, cooperative. Pleasant affect.    Results:  Personally reviewed  MRI SHOULDER, LEFT (CPT=73221)  Exam: MRI SHOULDER LT W/O CONTRAST  CPT Code(s): 59788 - MRI JOINT UPR EXTREM W/O DYE    CLINICAL HISTORY: Shoulder pain, weakness and limited range of motion.    TECHNIQUE: Non-infused, multiplanar and multi-sequential ultrahigh field 3.0 Mary Ellen MRI of the left shoulder was performed.    COMPARISON: None.    FINDINGS:  There is an intermediate to high-grade partial thickness articular surface tear involving the anterior central fibers of the supraspinatus tendon with approximately 2 cm retraction of some of the torn articular surface fibers. There is heterogeneous   increased signal, some of which is of fluid signal intensity, in the distal subscapularis tendon superiorly. No full-thickness rotator cuff tear is identified. There is trace fluid in the subacromial/subdeltoid bursa with mild to moderate fluid in the   subcoracoid bursa.    There is somewhat ill-defined longitudinally oriented partial tearing of the intra-articular and proximal extra-articular segments of the biceps tendon long head. There is subluxation of the biceps tendon partially out of the bicipital groove and into   the substance of the subscapularis tendon distally and superiorly.    Abnormal heterogeneous ill-defined increased signal is seen within  the superior labrum. There is irregular linear signal extending along the base of the posterosuperior labrum to the level of the equator. Punctate foci of fluid abuts the posterosuperior   and posteroinferior labrum.    There is no joint effusion. The glenohumeral joint space is minimally narrowed. There is mild degenerative change at the acromioclavicular joint and a type I anterior acromion process. No fracture or focal bone lesion is identified. A small cyst is noted   in the humerus lesser tuberosity. There is subtle fatty atrophy of the teres minor muscle. The quadrilateral space appears clear.    IMPRESSION:  1. Intermediate to high-grade partial thickness articular surface supraspinatus tendon tear with mild retraction of the torn tendon margin. There is interstitial tearing and tendinosis of the subscapularis tendon distally.  2. Irregular longitudinal partial tearing of the biceps tendon long head. There is subluxation of the biceps tendon long head partially out of the bicipital groove and into the substance of the torn subscapularis tendon.  3. Degenerative tearing of the superior/posterosuperior labrum.  4. There is subtle fatty atrophy of the teres minor muscle. This can be seen with a denervation type process. The quadrilateral space appears clear, however.    This report was performed utilizing speech recognition software technology. Despite thorough proofreading, speech recognition errors could escape detection. If a word or phrase is confusing or out of context, please do not hesitate to call for   clarification.    Interpreting Radiologist:    Montez Maurer M.D.  Electronically Signed: 01/23/2024 05:37 PM      PFTs:       No data to display                   No data to display                    WBC: 3.7, done on 7/24/2023.  HGB: 14.2, done on 7/24/2023.  PLT: 198, done on 7/24/2023.     GFR(AA): 103, done on 8/30/2021.  GFR (non-AA): 89, done on 8/30/2021.  CA: 8.6, done on 12/8/2022.  Cl: 108, done  on 12/8/2022.  CO2: 28, done on 12/8/2022.     XR SHOULDER, COMPLETE (MIN 2 VIEWS), LEFT (CPT=73030)    Result Date: 1/15/2024  PROCEDURE:  XR SHOULDER, COMPLETE (MIN 2 VIEWS), LEFT (CPT=73030)  TECHNIQUE:  Multiple views were obtained.  COMPARISON:  [facility], XR, XR SHOULDER, COMPLETE (MIN 2 VIEWS), LEFT (CPT=73030), 2/18/2023, 10:28 AM.  INDICATIONS:  M25.512 Left shoulder pain, unspecified chronicity  PATIENT STATED HISTORY: (As transcribed by Technologist)  Ortho consult. Patient states left shoulder pain for 1 month.    FINDINGS: No fracture or dislocation. Appropriate internal and external rotation is demonstrated. Left glenohumeral joint space is well maintained.  Subacromial osteophytes noted.  Minimal hypertrophic changes of the left acromioclavicular joint.   IMPRESSION: Mild osteoarthritic changes in the left shoulder.  Subacromial osteophytes noted.  Possibility of impingement cannot be excluded.   LOCATION:  [facility]   Dictated by (CST): Jose Daniel Lynch MD on 1/15/2024 at 1:52 PM     Finalized by (CST): Jose Daniel Lynch MD on 1/15/2024 at 1:52 PM         Assessment/Plan:  #1. Post covid reactive airways/possible asthma  Using Symbicort as needed, I am fine with this regimen  If has exacerbation can go back to BID  If has worsening of symptoms past that, can make follow up appt        Return if symptoms worsen or fail to improve.      En Yun MD  2/14/2024

## 2024-02-16 DIAGNOSIS — Z98.890 S/P ARTHROSCOPY OF SHOULDER: ICD-10-CM

## 2024-02-16 NOTE — TELEPHONE ENCOUNTER
Tramadol   DOS: 02/08/24  Last OV: 02/14/24  Last refill date: 02/06/24     #/refills: 20/1  Upcoming appt: No future appointments.

## 2024-02-22 RX ORDER — TRAMADOL HYDROCHLORIDE 50 MG/1
TABLET ORAL
Qty: 20 TABLET | Refills: 0 | Status: SHIPPED | OUTPATIENT
Start: 2024-02-22

## 2024-03-14 ENCOUNTER — TELEPHONE (OUTPATIENT)
Dept: ORTHOPEDICS CLINIC | Facility: CLINIC | Age: 64
End: 2024-03-14

## 2024-03-14 NOTE — TELEPHONE ENCOUNTER
Patient called stating that he is doing great post surgery. But he was wondering if he was still supposed to follow up in 30 days from last office visit  I reiterated the LOV note \"Return to clinic on an as needed basis. \" But he still wanted me to double check, please advise

## 2024-03-15 NOTE — TELEPHONE ENCOUNTER
Spoke with patient who stated that his PT Gertrudis thinks there is a stitch that still needs to be removed that is under the skin, so an appointment was made for Sincer to assess.

## 2024-03-15 NOTE — TELEPHONE ENCOUNTER
March 15, 2024  Sandy Reeves PA   to [facility] Clinical Pool       3/15/24  7:43 AM  If he's doing well, can remain as needed!

## 2024-03-20 ENCOUNTER — OFFICE VISIT (OUTPATIENT)
Dept: ORTHOPEDICS CLINIC | Facility: CLINIC | Age: 64
End: 2024-03-20
Payer: MEDICARE

## 2024-03-20 DIAGNOSIS — Z98.890 S/P ARTHROSCOPY OF SHOULDER: Primary | ICD-10-CM

## 2024-03-20 PROCEDURE — 99024 POSTOP FOLLOW-UP VISIT: CPT | Performed by: PHYSICIAN ASSISTANT

## 2024-03-20 NOTE — PROGRESS NOTES
[facility]  [address] [place] [id number]  [phone]       Name: Brice Tian   MRN: [id number]  Date: 3/20/2024     REASON FOR VISIT: Second Post-Surgical Visit   Surgery: Left shoulder scope, rotator cuff repair, biceps tenodesis, subacromial decompression, distal clavicle on 02/08/2024.    INTERVAL HISTORY:  Brice Tian is a 63 year old male who returns after the aforementioned procedure.  The post-operative course has been unremarkable with pain well controlled and overall progress noted.     Physical therapy was started and is progressing well.  No acute issues. 1/10 pain.     ROS: ROS    PE:   There were no vitals filed for this visit.  Estimated body mass index is 25.25 kg/m² as calculated from the following:    Height as of 2/14/24: 5' 9\" (1.753 m).    Weight as of 2/14/24: 171 lb (77.6 kg).    Physical Exam  Constitutional:       Appearance: Normal appearance.   HENT:      Head: Normocephalic and atraumatic.   Eyes:      Extraocular Movements: Extraocular movements intact.   Neck:      Musculoskeletal: Normal range of motion and neck supple.   Cardiovascular:      Pulses: Normal pulses.   Pulmonary:      Effort: Pulmonary effort is normal. No respiratory distress.   Abdominal:      General: There is no distension.   Skin:     General: Skin is warm.      Capillary Refill: Capillary refill takes less than 2 seconds.      Findings: No bruising.   Neurological:      General: No focal deficit present.      Mental Status: She is alert.   Psychiatric:         Mood and Affect: Mood normal.     Examination of the right shoulder demonstrates:     Physical examination the patient is alert and oriented x3, well-developed, well-nourished, no acute distress.     160 of forward flexion, 45 of ER, and IR to T12. 5/5 strength.     Incisional sites are clean dry intact without signs of active pathology.      The contralateral shoulder is without limitation in  range of motion or strength, no positive provocative maneuvers.       Radiographic Examination/Diagnostics:    I personally viewed, independently interpreted and radiology report was reviewed.      MRI SHOULDER, LEFT (CPT=73221)    Result Date: 1/23/2024  Exam: MRI SHOULDER LT W/O CONTRAST CPT Code(s): 38417 - MRI JOINT UPR EXTREM W/O DYE CLINICAL HISTORY: Shoulder pain, weakness and limited range of motion. TECHNIQUE: Non-infused, multiplanar and multi-sequential ultrahigh field 3.0 Mary Ellen MRI of the left shoulder was performed. COMPARISON: None. FINDINGS: There is an intermediate to high-grade partial thickness articular surface tear involving the anterior central fibers of the supraspinatus tendon with approximately 2 cm retraction of some of the torn articular surface fibers. There is heterogeneous increased signal, some of which is of fluid signal intensity, in the distal subscapularis tendon superiorly. No full-thickness rotator cuff tear is identified. There is trace fluid in the subacromial/subdeltoid bursa with mild to moderate fluid in the subcoracoid bursa. There is somewhat ill-defined longitudinally oriented partial tearing of the intra-articular and proximal extra-articular segments of the biceps tendon long head. There is subluxation of the biceps tendon partially out of the bicipital groove and into the substance of the subscapularis tendon distally and superiorly. Abnormal heterogeneous ill-defined increased signal is seen within the superior labrum. There is irregular linear signal extending along the base of the posterosuperior labrum to the level of the equator. Punctate foci of fluid abuts the posterosuperior and posteroinferior labrum. There is no joint effusion. The glenohumeral joint space is minimally narrowed. There is mild degenerative change at the acromioclavicular joint and a type I anterior acromion process. No fracture or focal bone lesion is identified. A small cyst is noted  in the  humerus lesser tuberosity. There is subtle fatty atrophy of the teres minor muscle. The quadrilateral space appears clear. IMPRESSION: 1. Intermediate to high-grade partial thickness articular surface supraspinatus tendon tear with mild retraction of the torn tendon margin. There is interstitial tearing and tendinosis of the subscapularis tendon distally. 2. Irregular longitudinal partial tearing of the biceps tendon long head. There is subluxation of the biceps tendon long head partially out of the bicipital groove and into the substance of the torn subscapularis tendon. 3. Degenerative tearing of the superior/posterosuperior labrum. 4. There is subtle fatty atrophy of the teres minor muscle. This can be seen with a denervation type process. The quadrilateral space appears clear, however. This report was performed utilizing speech recognition software technology. Despite thorough proofreading, speech recognition errors could escape detection. If a word or phrase is confusing or out of context, please do not hesitate to call for clarification. Interpreting Radiologist: Montez Maurer M.D. Electronically Signed: 01/23/2024 05:37 PM    XR SHOULDER, COMPLETE (MIN 2 VIEWS), LEFT (CPT=73030)    Result Date: 1/15/2024            PROCEDURE:  XR SHOULDER, COMPLETE (MIN 2 VIEWS), LEFT (CPT=73030)  TECHNIQUE:  Multiple views were obtained.  COMPARISON:  SHARMILA VELAZQUEZ, XR SHOULDER, COMPLETE (MIN 2 VIEWS), LEFT (CPT=73030), 2/18/2023, 10:28 AM.  INDICATIONS:  M25.512 Left shoulder pain, unspecified chronicity  PATIENT STATED HISTORY: (As transcribed by Technologist)  Ortho consult. Patient states left shoulder pain for 1 month.    FINDINGS: No fracture or dislocation. Appropriate internal and external rotation is demonstrated. Left glenohumeral joint space is well maintained.  Subacromial osteophytes noted.  Minimal hypertrophic changes of the left acromioclavicular joint.   IMPRESSION: Mild osteoarthritic changes in the left  shoulder.  Subacromial osteophytes noted.  Possibility of impingement cannot be excluded.   LOCATION:  [facility]   Dictated by (CST): Jose Daniel Lynch MD on 1/15/2024 at 1:52 PM     Finalized by (CST): Jose Daniel Lynch MD on 1/15/2024 at 1:52 PM         IMPRESSION: Brice Tian is a 63 year old male who presents 6 weeks s/p Left shoulder scope, rotator cuff repair, biceps tenodesis, subacromial decompression, distal clavicle on 02/08/2024.    PLAN:   We had a lengthy discussion with the patient regarding the patient's findings consistent with the expected postoperative course. We recommend continuation of physical therapy with rehabilitation efforts focused on strengthening, range of motion, functional ability, and return to baseline activity. The patient can continue to progress per protocol.    All questions were answered appropriately and the patient was in agreement with the treatment plan.       FOLLOW-UP:  Return to clinic in eight weeks. No imaging required at next visit.             Sandy Reeves [facility], PA-C Orthopedic Surgery / Sports Medicine Specialist  [facility]  [address] [place] [address].org  [email or web address]  t: [phone]  o: [phone]  f: [phone]    This note was dictated using Dragon software.  While it was briefly proofread prior to completion, some grammatical, spelling, and word choice errors due to dictation may still occur.

## 2024-04-10 ENCOUNTER — TELEPHONE (OUTPATIENT)
Dept: ORTHOPEDICS CLINIC | Facility: CLINIC | Age: 64
End: 2024-04-10

## 2024-04-10 DIAGNOSIS — Z98.890 S/P ARTHROSCOPY OF SHOULDER: Primary | ICD-10-CM

## 2024-04-17 ENCOUNTER — TELEPHONE (OUTPATIENT)
Dept: INTERNAL MEDICINE CLINIC | Facility: CLINIC | Age: 64
End: 2024-04-17

## 2024-04-22 ENCOUNTER — TELEPHONE (OUTPATIENT)
Facility: CLINIC | Age: 64
End: 2024-04-22

## 2024-04-22 NOTE — TELEPHONE ENCOUNTER
Received fax from [employer] that Symbicort 80-4.5 not covered.  Preferred alternatives are:  Wixela  Fluticasone Salmeterol  Breo  Please advise.

## 2024-04-23 RX ORDER — FLUTICASONE PROPIONATE AND SALMETEROL 250; 50 UG/1; UG/1
1 POWDER RESPIRATORY (INHALATION) EVERY 12 HOURS SCHEDULED
Qty: 1 EACH | Refills: 5 | Status: SHIPPED | OUTPATIENT
Start: 2024-04-23

## 2024-04-23 NOTE — TELEPHONE ENCOUNTER
Pt last seen by Dr. Yun on 2-14-24.  Per OV notes:  Using Symbicort as needed, I am fine with this regimen.  Symbicort not covered by insurance.  Per Dr. Yun:  Wixela 250/50.  Rx for Wixela sent.  Left message for pt with above information

## 2024-04-24 ENCOUNTER — TELEPHONE (OUTPATIENT)
Dept: INTERNAL MEDICINE CLINIC | Facility: CLINIC | Age: 64
End: 2024-04-24

## 2024-04-24 DIAGNOSIS — Z00.00 ENCOUNTER FOR PREVENTATIVE ADULT HEALTH CARE EXAMINATION: Primary | ICD-10-CM

## 2024-04-24 DIAGNOSIS — Z85.46 HISTORY OF PROSTATE CANCER: ICD-10-CM

## 2024-04-24 DIAGNOSIS — E78.00 PURE HYPERCHOLESTEROLEMIA: ICD-10-CM

## 2024-04-24 NOTE — TELEPHONE ENCOUNTER
Pt scheduled overdue AWV, requesting for lab orders to be entered.    Future Appointments   Date Time Provider Department Center   5/16/2024  9:00 AM Kathy Saldaña MD [facility]   5/20/2024 12:40 PM Sandy Reeves PA [id number]

## 2024-04-26 ENCOUNTER — TELEPHONE (OUTPATIENT)
Facility: CLINIC | Age: 64
End: 2024-04-26

## 2024-04-26 NOTE — TELEPHONE ENCOUNTER
Pt has been using Wixela 250-50 since 4-23-24.  Since being off the Symbicort 80-4.5.  He reports that his cough has returned and it comes and goes though out the day.  He has been experiecing these sx since September when he had covid but the cough resolved with Symbicort.  Pt requesting we call insurance to get Symbicort approved.  All placed to [facility] at [phone] and provided them with ID# [id number].  PA initiated and will be forwarded to exceptions team.  We should receive a fax within 48-72 hours.

## 2024-04-29 NOTE — TELEPHONE ENCOUNTER
Received fax and completed.  Per questions, pt needs to try and fail Wixela and Breo.  Pt has only tried Wixela and has increase in cough.  Pt expressed frustration having to try and pay for medications when he knows the Symbicort is effective for him.  Dr. Yun signed form and form was faxed to [facility].

## 2024-05-09 ENCOUNTER — LAB ENCOUNTER (OUTPATIENT)
Dept: LAB | Age: 64
End: 2024-05-09
Attending: INTERNAL MEDICINE
Payer: MEDICARE

## 2024-05-09 DIAGNOSIS — Z85.46 HISTORY OF PROSTATE CANCER: ICD-10-CM

## 2024-05-09 DIAGNOSIS — E78.00 PURE HYPERCHOLESTEROLEMIA: ICD-10-CM

## 2024-05-09 DIAGNOSIS — Z00.00 ENCOUNTER FOR PREVENTATIVE ADULT HEALTH CARE EXAMINATION: ICD-10-CM

## 2024-05-09 LAB
ALBUMIN SERPL-MCNC: 4.3 G/DL (ref 3.2–4.8)
ALBUMIN/GLOB SERPL: 1.7 {RATIO} (ref 1–2)
ALP LIVER SERPL-CCNC: 58 U/L
ALT SERPL-CCNC: 14 U/L
ANION GAP SERPL CALC-SCNC: 6 MMOL/L (ref 0–18)
AST SERPL-CCNC: 23 U/L (ref ?–34)
BASOPHILS # BLD AUTO: 0.02 X10(3) UL (ref 0–0.2)
BASOPHILS NFR BLD AUTO: 0.6 %
BILIRUB SERPL-MCNC: 0.6 MG/DL (ref 0.2–1.1)
BUN BLD-MCNC: 14 MG/DL (ref 9–23)
BUN/CREAT SERPL: 14.6 (ref 10–20)
CALCIUM BLD-MCNC: 9.1 MG/DL (ref 8.7–10.4)
CHLORIDE SERPL-SCNC: 109 MMOL/L (ref 98–112)
CHOLEST SERPL-MCNC: 244 MG/DL (ref ?–200)
CO2 SERPL-SCNC: 27 MMOL/L (ref 21–32)
CREAT BLD-MCNC: 0.96 MG/DL
DEPRECATED RDW RBC AUTO: 44.4 FL (ref 35.1–46.3)
EGFRCR SERPLBLD CKD-EPI 2021: 88 ML/MIN/1.73M2 (ref 60–?)
EOSINOPHIL # BLD AUTO: 0.23 X10(3) UL (ref 0–0.7)
EOSINOPHIL NFR BLD AUTO: 6.6 %
ERYTHROCYTE [DISTWIDTH] IN BLOOD BY AUTOMATED COUNT: 15.3 % (ref 11–15)
FASTING PATIENT LIPID ANSWER: YES
FASTING STATUS PATIENT QL REPORTED: YES
GLOBULIN PLAS-MCNC: 2.6 G/DL (ref 2–3.5)
GLUCOSE BLD-MCNC: 96 MG/DL (ref 70–99)
HCT VFR BLD AUTO: 39 %
HDLC SERPL-MCNC: 48 MG/DL (ref 40–59)
HGB BLD-MCNC: 12.6 G/DL
IMM GRANULOCYTES # BLD AUTO: 0.01 X10(3) UL (ref 0–1)
IMM GRANULOCYTES NFR BLD: 0.3 %
LDLC SERPL CALC-MCNC: 181 MG/DL (ref ?–100)
LYMPHOCYTES # BLD AUTO: 1.17 X10(3) UL (ref 1–4)
LYMPHOCYTES NFR BLD AUTO: 33.7 %
MCH RBC QN AUTO: 26 PG (ref 26–34)
MCHC RBC AUTO-ENTMCNC: 32.3 G/DL (ref 31–37)
MCV RBC AUTO: 80.6 FL
MONOCYTES # BLD AUTO: 0.4 X10(3) UL (ref 0.1–1)
MONOCYTES NFR BLD AUTO: 11.5 %
NEUTROPHILS # BLD AUTO: 1.64 X10 (3) UL (ref 1.5–7.7)
NEUTROPHILS # BLD AUTO: 1.64 X10(3) UL (ref 1.5–7.7)
NEUTROPHILS NFR BLD AUTO: 47.3 %
NONHDLC SERPL-MCNC: 196 MG/DL (ref ?–130)
OSMOLALITY SERPL CALC.SUM OF ELEC: 294 MOSM/KG (ref 275–295)
PLATELET # BLD AUTO: 211 10(3)UL (ref 150–450)
POTASSIUM SERPL-SCNC: 3.9 MMOL/L (ref 3.5–5.1)
PROT SERPL-MCNC: 6.9 G/DL (ref 5.7–8.2)
PSA SERPL-MCNC: <0.04 NG/ML (ref ?–4)
RBC # BLD AUTO: 4.84 X10(6)UL
SODIUM SERPL-SCNC: 142 MMOL/L (ref 136–145)
TRIGL SERPL-MCNC: 88 MG/DL (ref 30–149)
VLDLC SERPL CALC-MCNC: 18 MG/DL (ref 0–30)
WBC # BLD AUTO: 3.5 X10(3) UL (ref 4–11)

## 2024-05-09 PROCEDURE — 80053 COMPREHEN METABOLIC PANEL: CPT

## 2024-05-09 PROCEDURE — 84153 ASSAY OF PSA TOTAL: CPT

## 2024-05-09 PROCEDURE — 36415 COLL VENOUS BLD VENIPUNCTURE: CPT

## 2024-05-09 PROCEDURE — 80061 LIPID PANEL: CPT

## 2024-05-09 PROCEDURE — 85025 COMPLETE CBC W/AUTO DIFF WBC: CPT

## 2024-05-14 ENCOUNTER — MED REC SCAN ONLY (OUTPATIENT)
Facility: CLINIC | Age: 64
End: 2024-05-14

## 2024-05-16 ENCOUNTER — OFFICE VISIT (OUTPATIENT)
Dept: INTERNAL MEDICINE CLINIC | Facility: CLINIC | Age: 64
End: 2024-05-16

## 2024-05-16 VITALS
HEART RATE: 65 BPM | WEIGHT: 172.81 LBS | RESPIRATION RATE: 12 BRPM | HEIGHT: 69.25 IN | BODY MASS INDEX: 25.31 KG/M2 | DIASTOLIC BLOOD PRESSURE: 60 MMHG | OXYGEN SATURATION: 98 % | SYSTOLIC BLOOD PRESSURE: 100 MMHG | TEMPERATURE: 98 F

## 2024-05-16 DIAGNOSIS — R35.1 BENIGN PROSTATIC HYPERPLASIA WITH NOCTURIA: ICD-10-CM

## 2024-05-16 DIAGNOSIS — M51.37 DEGENERATION OF LUMBAR OR LUMBOSACRAL INTERVERTEBRAL DISC: ICD-10-CM

## 2024-05-16 DIAGNOSIS — I25.10 CORONARY ARTERY CALCIFICATION: ICD-10-CM

## 2024-05-16 DIAGNOSIS — E78.00 PURE HYPERCHOLESTEROLEMIA: Chronic | ICD-10-CM

## 2024-05-16 DIAGNOSIS — Z00.00 INITIAL MEDICARE ANNUAL WELLNESS VISIT: Primary | ICD-10-CM

## 2024-05-16 DIAGNOSIS — M65.341 TRIGGER FINGER, RIGHT RING FINGER: ICD-10-CM

## 2024-05-16 DIAGNOSIS — H40.1133 PRIMARY OPEN ANGLE GLAUCOMA (POAG) OF BOTH EYES, SEVERE STAGE: Chronic | ICD-10-CM

## 2024-05-16 DIAGNOSIS — N52.03 COMBINED ARTERIAL INSUFFICIENCY AND CORPORO-VENOUS OCCLUSIVE ERECTILE DYSFUNCTION: ICD-10-CM

## 2024-05-16 DIAGNOSIS — Z85.46 HISTORY OF PROSTATE CANCER: ICD-10-CM

## 2024-05-16 DIAGNOSIS — I25.84 CORONARY ARTERY CALCIFICATION: ICD-10-CM

## 2024-05-16 DIAGNOSIS — R05.3 CHRONIC COUGH: ICD-10-CM

## 2024-05-16 DIAGNOSIS — R91.8 MULTIPLE PULMONARY NODULES DETERMINED BY COMPUTED TOMOGRAPHY OF LUNG: ICD-10-CM

## 2024-05-16 DIAGNOSIS — M48.02 CERVICAL STENOSIS OF SPINAL CANAL: ICD-10-CM

## 2024-05-16 DIAGNOSIS — D64.9 NORMOCYTIC ANEMIA: ICD-10-CM

## 2024-05-16 DIAGNOSIS — N40.1 BENIGN PROSTATIC HYPERPLASIA WITH NOCTURIA: ICD-10-CM

## 2024-05-16 NOTE — PROGRESS NOTES
Subjective:   Brice Tian is a 64 year old male who presents for a Medicare Initial Annual Wellness visit (Once after 12 month Medicare anniversary)  and scheduled follow up of multiple significant but stable problems.   Preventative health - just had screening labs.  Hx of prostate cancer - following annually with Urology.  Undetectable screening PSA this month.  Hypercholesterolemia, coronary artery calcification - lifestyle controlled.  LDL elevated, 10 year ASCVD risk 8.4%.  Multiple pulmonary nodules, chronic cough - intermittent cough since after his COVID infection last year.  He saw Pulmonology, was prescribed Symbicort - this helped his symptoms a  lot.  Was taking this PRN but insurance no longer covering it.  Prescribed Breo but this did not help.    Cervical spinal stenosis, lumbar disc disease - stable back pain.  BPH with nocturia, erectile dysfunction - following with Urology.  On PRN tadalafil.  Glaucoma - severe; following regularly with Ophthalmology, on prescription eye drops.  Did have mild normocytic anemia on labs this month.  No melena or hematochezia.    Occasional triggering of right ring finger.    Had episodes of burning/sharp pain in right patellar area.  Then had similar feelings in right arm. Symptoms lasted for a couple of weeks.  Symptoms now fully resolved.  Waking up a lot in the night - thinking a lot about things, procrastination.      History/Other:   Fall Risk Assessment:   He has been screened for Falls and is low risk.      Cognitive Assessment:   He had a completely normal cognitive assessment - see flowsheet entries       Functional Ability/Status:   Brice Tian has some abnormal functions as listed below:  He has Vision problems based on screening of functional status.       Depression Screening (PHQ-2/PHQ-9): PHQ-2 SCORE: 0  , done 5/9/2024             Advanced Directives:   He does have a Living Will but we do NOT have it on file in Epic.    He does have a  POA but we do NOT have it on file in [facility].    Patient has Advance Care Planning documents but we do not have a copy in EMR. Discussed Advanced Care Planning with patient and instructed patient to get our office a copy to be scanned into EMR.      Patient Active Problem List   Diagnosis    History of prostate cancer    Combined arterial insufficiency and corporo-venous occlusive erectile dysfunction    Primary open angle glaucoma (POAG) of both eyes, severe stage    History of brachytherapy    Multiple pulmonary nodules determined by CT lung seen on 3/2018 exam    Cervical stenosis of spinal canal    Degeneration of lumbar or lumbosacral intervertebral disc    Personal history of colonic polyps    Polyp of colon    Enlarged prostate with lower urinary tract symptoms (LUTS)    Eczema    PVC's (premature ventricular contractions)    Pure hypercholesterolemia    Coronary artery calcification    Chronic cough    Normocytic anemia    Trigger finger, right ring finger     Allergies:  He is allergic to penicillins.    Current Medications:  Outpatient Medications Marked as Taking for the 5/16/24 encounter (Office Visit) with Kathy Saldaña MD   Medication Sig    Clobetasol Propionate 0.05 % External Gel Apply 1 Application topically 2 (two) times daily. Apply to scalp bid    triamcinolone 0.1 % External Cream Apply topically 2 (two) times daily as needed.    betamethasone dipropionate 0.05 % External Lotion APPLY TO THE AFFECTED AREA ON SCALP DAILY AS NEEDED FOR FLARES    Tadalafil (CIALIS) 20 MG Oral Tab Take 1 tablet (20 mg total) by mouth as needed for Erectile Dysfunction.    RHOPRESSA 0.02 % Ophthalmic Solution Place 1 drop into both eyes at bedtime.    latanoprost 0.005 % Ophthalmic Solution Place 1 drop into both eyes nightly.    dorzolamide-timolol 22.3-6.8 MG/ML Ophthalmic Solution Place 1 drop into both eyes 2 (two) times daily.       Medical History:  He  has a past medical history of Allergic rhinitis (May  2021), Attention deficit disorder without mention of hyperactivity (2011), Glaucoma, Hyperlipidemia (2020), and Prostate cancer (HCC).  Surgical History:  He  has a past surgical history that includes other surgical history (4/30/14); other surgical history (8/5/14); and colonoscopy.   Family History:  His family history includes Cancer in his brother and father; Glaucoma in his mother and sister; Heart Disease in his paternal grandfather; Heart Disorder in his brother; west Nile Fever in his mother.  Social History:  He  reports that he has never smoked. He has never used smokeless tobacco. He reports that he does not drink alcohol and does not use drugs.    Tobacco:  He has never smoked tobacco.    CAGE Alcohol Screen:   CAGE screening score of 0 on 5/9/2024, showing low risk of alcohol abuse.      Patient Care Team:  Kathy Saldaña MD as PCP - General (Internal Medicine)  Maurice Ferguson MD as Consulting Physician (UROLOGY)  Harsh Garcia MD as Consulting Physician (GASTROENTEROLOGY)  Oliver Torres (OPHTHALMOLOGY)  Jonathan Brian MD (DERMATOLOGY)    Review of Systems  GENERAL: feels well otherwise  SKIN: denies any unusual skin lesions  EYES: denies blurred vision or double vision  HEENT: denies nasal congestion, sinus pain or ST  LUNGS: occasional cough/shortness of breath  CARDIOVASCULAR: denies chest pain on exertion  GI: denies abdominal pain, denies heartburn  : nocturia, no complaint of urinary incontinence  MUSCULOSKELETAL: right trigger finger; denies back pain  NEURO: paresthesias in right knee/arm, resolved; denies headaches  PSYCHE: denies depression or anxiety  HEMATOLOGIC: denies hx of anemia  ENDOCRINE: denies thyroid history  ALL/ASTHMA: denies hx of allergy or asthma    Objective:   Physical Exam  /60 (BP Location: Left arm, Patient Position: Sitting, Cuff Size: adult)   Pulse 65   Temp 98 °F (36.7 °C) (Temporal)   Resp 12   Ht 5' 9.25\" (1.759 m)   Wt 172 lb 12.8 oz  (78.4 kg)   SpO2 98%   BMI 25.33 kg/m²  Estimated body mass index is 25.33 kg/m² as calculated from the following:    Height as of this encounter: 5' 9.25\" (1.759 m).    Weight as of this encounter: 172 lb 12.8 oz (78.4 kg).  Medicare Hearing Assessment:   Hearing Screening    Time taken: 5/16/2024  8:59 AM  Entry User: Patricia Nieves CMA  Screening Method: Finger Rub  Finger Rub Result: Pass       Visual Acuity:   Right Eye Visual Acuity: Corrected Right Eye Chart Acuity: 20/25   Left Eye Visual Acuity: Corrected Left Eye Chart Acuity: 20/25   Both Eyes Visual Acuity: Corrected Both Eyes Chart Acuity: 20/25   Able To Tolerate Visual Acuity: Yes    GENERAL: Alert and oriented, well developed, well nourished,in no apparent distress  SKIN: no rashes,no suspicious lesions  HEENT: atraumatic, PERRLA, EOMI, normal lid and conjunctiva  NECK: supple, no jvd, no thyromegaly  LUNGS: clear to auscultation bilaterally, no wheezing/rubs  CARDIO: RRR without murmurs.  No clubbing, cyanosis or edema.  GI: soft non tender nondistended no hepatosplenomegaly, bowel sounds throughout  NEURO: CN II-XII intact, 5/5 strength all extremities  MS: Full ROM, no joint pain  PSYCH: pleasant, appropriate mood and affect    Assessment & Plan:   Brice Tian is a 64 year old male who presents for a Medicare Assessment.   1. Initial Medicare annual wellness visit  2. History of prostate cancer  Age appropriate health guidance and counseling provided.  Screening labs done earlier this month.  Up to date with colonoscopy.  Body mass index is 25.33 kg/m².  Undetectable PSA, following with Urology for history of prostate cancer.    3. Pure hypercholesterolemia  4. Coronary artery calcification  Lifestyle controlled.  10 year ASCVD risk 9.3%.  Recommend starting statin if lipids remain in this range.  Dietary handout provided.  Dietician information provided.  - Lipid Panel; Future  - DIETITIAN EDUCATION NON-DIABETES, DIET  (INTERNAL)      5. Multiple pulmonary nodules determined by CT lung seen on 3/2018 exam  6. Chronic cough  Following with Pulmonology (Dr. Yun).  Did great on PRN Symbicort; not as well on Breo.  Having some insurance issues with Symbicort currently.    7. Degeneration of lumbar or lumbosacral intervertebral disc  8. Cervical stenosis of spinal canal  Stable neck and back pain.  Continue to monitor.    9. Benign prostatic hyperplasia with nocturia  10. Combined arterial insufficiency and corporo-venous occlusive erectile dysfunction  Following with Urology.  On PRN tadalafil.    11. Primary open angle glaucoma (POAG) of both eyes, severe stage  Severe glaucoma - following regularly with Ophthalmology.  On prescription eye drops.    12. Normocytic anemia  Mild anemia.  Looking back hemoglobin has fluctuated.  No melena or hematochezia.  Will monitor, will check iron studies with next labs.   - CBC With Differential With Platelet; Future  - Iron And Tibc; Future  - Ferritin; Future    13. Trigger finger, right ring finger  Mild symptoms/tolerable. Recommend home physical therapy, handout provided.  Advised patient to contact us if symptoms worsen - will refer to Ortho Hand (Dr. Matthew) at that point.    The patient indicates understanding of these issues and agrees to the plan.  Reinforced healthy diet, lifestyle, and exercise.      No follow-ups on file.     Kathy Saldaña MD, 5/16/2024     Supplementary Documentation:   General Health:  In the past six months, have you lost more than 10 pounds without trying?: 2 - No  Has your appetite been poor?: No  Type of Diet: Balanced  How does the patient maintain a good energy level?: Appropriate Exercise;Daily Walks;Stretching;Other  How would you describe your daily physical activity?: Moderate  How would you describe your current health state?: Good  How do you maintain positive mental well-being?: Social Interaction;Visiting Friends;Visiting Family  On a scale of 0  to 10, with 0 being no pain and 10 being severe pain, what is your pain level?: 0 - (None)  In the past six months, have you experienced urine leakage?: 0-No  At any time do you feel concerned for the safety/well-being of yourself and/or your children, in your home or elsewhere?: No  Have you had any immunizations at another office such as Influenza, Hepatitis B, Tetanus, or Pneumococcal?: No        Brice Tian's SCREENING SCHEDULE   Tests on this list are recommended by your physician but may not be covered, or covered at this frequency, by your insurer.   Please check with your insurance carrier before scheduling to verify coverage.   PREVENTATIVE SERVICES FREQUENCY &  COVERAGE DETAILS LAST COMPLETION DATE   Diabetes Screening    Fasting Blood Sugar / Glucose    One screening every 12 months if never tested or if previously tested but not diagnosed with pre-diabetes   One screening every 6 months if diagnosed with pre-diabetes Lab Results   Component Value Date    GLU 96 05/09/2024        Cardiovascular Disease Screening    Lipid Panel  Cholesterol  Lipoprotein (HDL)  Triglycerides Covered every 5 years for all Medicare beneficiaries without apparent signs or symptoms of cardiovascular disease Lab Results   Component Value Date    CHOLEST 244 (H) 05/09/2024    HDL 48 05/09/2024     (H) 05/09/2024    TRIG 88 05/09/2024         Electrocardiogram (EKG)   Covered if needed at Welcome to Medicare, and non-screening if indicated for medical reasons 01/03/2022      Ultrasound Screening for Abdominal Aortic Aneurysm (AAA) Covered once in a lifetime for one of the following risk factors    Men who are 65-75 years old and have ever smoked    Anyone with a family history -     Colorectal Cancer Screening  Covered for ages 50-85; only need ONE of the following:    Colonoscopy   Covered every 10 years    Covered every 2 years if patient is at high risk or previous colonoscopy was abnormal 03/10/2020    Health  Maintenance   Topic Date Due    Colorectal Cancer Screening  03/10/2025       Flexible Sigmoidoscopy   Covered every 4 years -    Fecal Occult Blood Test Covered annually -   Prostate Cancer Screening    Prostate-Specific Antigen (PSA) Annually Lab Results   Component Value Date    PSA <0.01 12/08/2022     Health Maintenance   Topic Date Due    PSA  05/09/2025      Immunizations    Influenza Covered once per flu season  Please get every year -  No recommendations at this time    Pneumococcal Each vaccine ([id number] & [id number]) covered once after 65 Prevnar 13: -    [id number]: -     Pneumococcal Vaccination(1 of 2 - PCV) Never done    Hepatitis B One screening covered for patients with certain risk factors   02/26/2010  No recommendations at this time    Tetanus Toxoid Not covered by Medicare Part B unless medically necessary (cut with metal); may be covered with your pharmacy prescription benefits 02/26/2010    Tetanus, Diptheria and Pertusis TD and TDaP Not covered by Medicare Part B -  DTaP,Tdap,and Td Vaccines(1 - Tdap) due on 02/27/2010    Zoster Not covered by Medicare Part B; may be covered with your pharmacy  prescription benefits -  Zoster Vaccines(1 of 2) Never done

## 2024-05-16 NOTE — PATIENT INSTRUCTIONS
- Cholesterol levels are high.  See handout.  - See handout for trigger finger exercises  - Let us know if stabbing/burning pain comes back  - Start daily over the counter iron supplement (325 mg daily)  - Get cholesterol/iron and blood count tests done in 3 months (August).  If cholesterol levels are still high I would recommend starting a prescription cholesterol medication.    It was a pleasure seeing you in the clinic today.  Thank you for choosing the [facility] office for your healthcare needs. Please call at [phone] with any questions or concerns.    Kathy Saldaña MD

## 2024-05-20 ENCOUNTER — OFFICE VISIT (OUTPATIENT)
Dept: ORTHOPEDICS CLINIC | Facility: CLINIC | Age: 64
End: 2024-05-20

## 2024-05-20 VITALS — HEIGHT: 69 IN | BODY MASS INDEX: 25.48 KG/M2 | WEIGHT: 172 LBS

## 2024-05-20 DIAGNOSIS — Z98.890 S/P ARTHROSCOPY OF SHOULDER: Primary | ICD-10-CM

## 2024-05-20 PROCEDURE — 99213 OFFICE O/P EST LOW 20 MIN: CPT | Performed by: ORTHOPAEDIC SURGERY

## 2024-05-20 NOTE — PROGRESS NOTES
[facility]  [address] [place] [id number]  [phone]       Name: Brice Tian   MRN: [id number]  Date: 5/20/2024     REASON FOR VISIT: Third Post-Surgical Visit   Surgery: Left shoulder scope, rotator cuff repair, biceps tenodesis, subacromial decompression, distal clavicle on 02/08/2024.    INTERVAL HISTORY:  Brice Tian is a 64 year old male who returns after the aforementioned procedure.  The post-operative course has been unremarkable with pain well controlled and overall progress noted.     Physical therapy was started and is progressing well. He notes that he is doing well with Gertrudis Ferguson, 0 out of 10 pain. Reports some clicking.      PE:   Vitals:    05/20/24 1253   Weight: 172 lb (78 kg)   Height: 5' 9\" (1.753 m)     Estimated body mass index is 25.4 kg/m² as calculated from the following:    Height as of this encounter: 5' 9\" (1.753 m).    Weight as of this encounter: 172 lb (78 kg).    Physical Exam  Constitutional:       Appearance: Normal appearance.   HENT:      Head: Normocephalic and atraumatic.   Eyes:      Extraocular Movements: Extraocular movements intact.   Neck:      Musculoskeletal: Normal range of motion and neck supple.   Cardiovascular:      Pulses: Normal pulses.   Pulmonary:      Effort: Pulmonary effort is normal. No respiratory distress.   Abdominal:      General: There is no distension.   Skin:     General: Skin is warm.      Capillary Refill: Capillary refill takes less than 2 seconds.      Findings: No bruising.   Neurological:      General: No focal deficit present.      Mental Status: She is alert.   Psychiatric:         Mood and Affect: Mood normal.     Examination of the right shoulder demonstrates:     Physical examination the patient is alert and oriented x3, well-developed, well-nourished, no acute distress.     160 of forward flexion, 45 of ER, and IR to T12. 5/5 strength.     Incisional sites are clean dry  intact without signs of active pathology.      The contralateral shoulder is without limitation in range of motion or strength, no positive provocative maneuvers.       Radiographic Examination/Diagnostics:    I personally viewed, independently interpreted and radiology report was reviewed.      MRI SHOULDER, LEFT (CPT=73221)    Result Date: 1/23/2024  Exam: MRI SHOULDER LT W/O CONTRAST CPT Code(s): 87605 - MRI JOINT UPR EXTREM W/O DYE CLINICAL HISTORY: Shoulder pain, weakness and limited range of motion. TECHNIQUE: Non-infused, multiplanar and multi-sequential ultrahigh field 3.0 Mary Ellen MRI of the left shoulder was performed. COMPARISON: None. FINDINGS: There is an intermediate to high-grade partial thickness articular surface tear involving the anterior central fibers of the supraspinatus tendon with approximately 2 cm retraction of some of the torn articular surface fibers. There is heterogeneous increased signal, some of which is of fluid signal intensity, in the distal subscapularis tendon superiorly. No full-thickness rotator cuff tear is identified. There is trace fluid in the subacromial/subdeltoid bursa with mild to moderate fluid in the subcoracoid bursa. There is somewhat ill-defined longitudinally oriented partial tearing of the intra-articular and proximal extra-articular segments of the biceps tendon long head. There is subluxation of the biceps tendon partially out of the bicipital groove and into the substance of the subscapularis tendon distally and superiorly. Abnormal heterogeneous ill-defined increased signal is seen within the superior labrum. There is irregular linear signal extending along the base of the posterosuperior labrum to the level of the equator. Punctate foci of fluid abuts the posterosuperior and posteroinferior labrum. There is no joint effusion. The glenohumeral joint space is minimally narrowed. There is mild degenerative change at the acromioclavicular joint and a type I  anterior acromion process. No fracture or focal bone lesion is identified. A small cyst is noted  in the humerus lesser tuberosity. There is subtle fatty atrophy of the teres minor muscle. The quadrilateral space appears clear. IMPRESSION: 1. Intermediate to high-grade partial thickness articular surface supraspinatus tendon tear with mild retraction of the torn tendon margin. There is interstitial tearing and tendinosis of the subscapularis tendon distally. 2. Irregular longitudinal partial tearing of the biceps tendon long head. There is subluxation of the biceps tendon long head partially out of the bicipital groove and into the substance of the torn subscapularis tendon. 3. Degenerative tearing of the superior/posterosuperior labrum. 4. There is subtle fatty atrophy of the teres minor muscle. This can be seen with a denervation type process. The quadrilateral space appears clear, however. This report was performed utilizing speech recognition software technology. Despite thorough proofreading, speech recognition errors could escape detection. If a word or phrase is confusing or out of context, please do not hesitate to call for clarification. Interpreting Radiologist: Montez Maurer M.D. Electronically Signed: 01/23/2024 05:37 PM    XR SHOULDER, COMPLETE (MIN 2 VIEWS), LEFT (CPT=73030)    Result Date: 1/15/2024            PROCEDURE:  XR SHOULDER, COMPLETE (MIN 2 VIEWS), LEFT (CPT=73030)  TECHNIQUE:  Multiple views were obtained.  COMPARISON:  CAROLINE, XR, XR SHOULDER, COMPLETE (MIN 2 VIEWS), LEFT (CPT=73030), 2/18/2023, 10:28 AM.  INDICATIONS:  M25.512 Left shoulder pain, unspecified chronicity  PATIENT STATED HISTORY: (As transcribed by Technologist)  Ortho consult. Patient states left shoulder pain for 1 month.    FINDINGS: No fracture or dislocation. Appropriate internal and external rotation is demonstrated. Left glenohumeral joint space is well maintained.  Subacromial osteophytes noted.  Minimal  hypertrophic changes of the left acromioclavicular joint.   IMPRESSION: Mild osteoarthritic changes in the left shoulder.  Subacromial osteophytes noted.  Possibility of impingement cannot be excluded.   LOCATION:  [facility]   Dictated by (CST): Jose Daniel Lynch MD on 1/15/2024 at 1:52 PM     Finalized by (CST): Jose Daniel Lynch MD on 1/15/2024 at 1:52 PM         IMPRESSION: Brice Tian is a 64 year old male who presents 3 months s/p Left shoulder scope, rotator cuff repair, biceps tenodesis, subacromial decompression, distal clavicle on 02/08/2024.    PLAN:   We had a lengthy discussion with the patient regarding the patient's findings consistent with the expected postoperative course. We recommend continuation of physical therapy with rehabilitation efforts focused on strengthening, range of motion, functional ability, and return to baseline activity. The patient can continue to progress per protocol.    All questions were answered appropriately and the patient was in agreement with the treatment plan.       FOLLOW-UP:  3 months, no imaging needed.         Tabatha Fair MD  Knee, Shoulder, & Elbow Surgery / Sports Medicine Specialist  Orthopaedic Surgery  [address] [place] [address] [id number] [place] [email or web address]  [email or web address]  t: [phone]  o: [phone]  f: [phone]     This note was dictated using Dragon software.  While it was briefly proofread prior to completion, some grammatical, spelling, and word choice errors due to dictation may still occur.

## 2024-05-21 ENCOUNTER — PATIENT MESSAGE (OUTPATIENT)
Dept: INTERNAL MEDICINE CLINIC | Facility: CLINIC | Age: 64
End: 2024-05-21

## 2024-05-22 NOTE — TELEPHONE ENCOUNTER
From: Brice Tian  To: Kathy Saldaña  Sent: 2024 12:29 PM CDT  Subject: Dietitian specialist    Dr Saldaña,  I called my insurance provider about scheduling a dietitian. They only cover pre- diabetes or diabetes unless a med necessity for something else. They said they need 3 things from you:  A). Letter of medical necessity   B). Medical records  C). Office notes       [employer]   [address] [place] [id number]  Phone: [phone]  Fax: [phone]  They said fax would be best!    I would remind you that my brother  of heart disease at age 60 and there is a history in the family of poor diet that led to heart disease.     Thank you again Dr. VIN Betancourt

## 2024-05-23 NOTE — TELEPHONE ENCOUNTER
Mychart letter entered/sent to patient via [employer].  We can fax letter and last office visit note.  If they need his full medical record he/labor fund would need to send records request to medical records department

## 2024-06-07 ENCOUNTER — MED REC SCAN ONLY (OUTPATIENT)
Dept: INTERNAL MEDICINE CLINIC | Facility: CLINIC | Age: 64
End: 2024-06-07

## 2024-07-26 ENCOUNTER — TELEPHONE (OUTPATIENT)
Dept: ORTHOPEDICS CLINIC | Facility: CLINIC | Age: 64
End: 2024-07-26

## 2024-07-26 NOTE — TELEPHONE ENCOUNTER
Dr Fair's clinic will be closed on Aug 12, 2024:    I have left a voicemail to patient informing that his appointment on this day will have to be rescheduled. We can double book him on any follow up slots at most convenient time. Tank you!

## 2024-08-06 ENCOUNTER — MED REC SCAN ONLY (OUTPATIENT)
Dept: ORTHOPEDICS CLINIC | Facility: CLINIC | Age: 64
End: 2024-08-06

## 2024-08-09 ENCOUNTER — OFFICE VISIT (OUTPATIENT)
Facility: CLINIC | Age: 64
End: 2024-08-09
Payer: MEDICARE

## 2024-08-09 ENCOUNTER — TELEPHONE (OUTPATIENT)
Facility: CLINIC | Age: 64
End: 2024-08-09

## 2024-08-09 VITALS
SYSTOLIC BLOOD PRESSURE: 122 MMHG | HEART RATE: 57 BPM | HEIGHT: 69 IN | BODY MASS INDEX: 25.77 KG/M2 | OXYGEN SATURATION: 99 % | RESPIRATION RATE: 16 BRPM | WEIGHT: 174 LBS | DIASTOLIC BLOOD PRESSURE: 68 MMHG

## 2024-08-09 DIAGNOSIS — J45.40 MODERATE PERSISTENT ASTHMA WITHOUT COMPLICATION (HCC): Primary | ICD-10-CM

## 2024-08-09 PROCEDURE — 99214 OFFICE O/P EST MOD 30 MIN: CPT | Performed by: INTERNAL MEDICINE

## 2024-08-09 RX ORDER — BUDESONIDE AND FORMOTEROL FUMARATE DIHYDRATE 160; 4.5 UG/1; UG/1
2 AEROSOL RESPIRATORY (INHALATION) 2 TIMES DAILY
Qty: 10.2 G | Refills: 5 | Status: SHIPPED | OUTPATIENT
Start: 2024-08-09 | End: 2024-08-09

## 2024-08-09 RX ORDER — BUDESONIDE AND FORMOTEROL FUMARATE DIHYDRATE 160; 4.5 UG/1; UG/1
2 AEROSOL RESPIRATORY (INHALATION) 2 TIMES DAILY
Qty: 10.2 G | Refills: 5 | Status: SHIPPED | OUTPATIENT
Start: 2024-08-09

## 2024-08-09 NOTE — TELEPHONE ENCOUNTER
Received a fax from [facility] requesting RX drug change.  Spoke with dr yun and he wants to keep patient on symbicort and would like a PRIOR AUTHORIZATION started.    In EMR(ELECTRONIC MEDICAL RECORD) Dr Yun states:\", patient was previously doing well on symbicort, but per insurance had to be switched to breo.  Since starting this his breathing has worsened again.  Requesting to be switched back to symbicort.\"  Called [facility] and Prior authorization process started. Form to be faxed to RN to complete and then to submit OFFICE VISIT notes.     Waiting fax.

## 2024-08-09 NOTE — TELEPHONE ENCOUNTER
Dr joseph signed form and faxed form back.   Received successful fax confirmation.    Sent signed form to scanning.     Closing encounter.

## 2024-08-09 NOTE — TELEPHONE ENCOUNTER
Received fax from [facility] for PRIOR AUTHORIZATION on patients symbicort.    Filled out faxed form and printed out OFFICE VISIT notes.    Will have Dr Yun sign form and will then fax back form.

## 2024-08-09 NOTE — PROGRESS NOTES
[facility] Pulmonary Follow Up Note    Chief Complaint:  Chief Complaint   Patient presents with    Follow - Up     Pt still having cough, doesn't feel like breo is helping        History of Present Illness:  Brice Tian is a 64 year old male who presents today for follow up of chronic cough/post covid reactive airways disease.      Interval history:  Since last visit, patient was previously doing well on symbicort, but per insurance had to be switched to breo.  Since starting this his breathing has worsened again.  Requesting to be switched back to symbicort.      Past Medical History:   Past Medical History:    Allergic rhinitis    Attention deficit disorder without mention of hyperactivity    Glaucoma    Hyperlipidemia    Prostate cancer (HCC)        Past Surgical History:   Past Surgical History:   Procedure Laterality Date    Colonoscopy      Other surgical history  4/30/14    prostate biopsy dr larry     Other surgical history  8/5/14    Seeds        Family Medical History:   Family History   Problem Relation Age of Onset    Cancer Father     Glaucoma Sister     Glaucoma Mother     Other (west Nile Fever) Mother     Heart Disease Paternal Grandfather     Cancer Brother     Heart Disorder Brother         Passed at age 60 sudden heart attack        Social History:   Social History     Socioeconomic History    Marital status:      Spouse name: Not on file    Number of children: Not on file    Years of education: Not on file    Highest education level: Not on file   Occupational History    Not on file   Tobacco Use    Smoking status: Never    Smokeless tobacco: Never    Tobacco comments:     None   Vaping Use    Vaping status: Never Used   Substance and Sexual Activity    Alcohol use: No    Drug use: Never    Sexual activity: Yes   Other Topics Concern     Service Not Asked    Blood Transfusions Not Asked    Caffeine Concern Yes     Comment: 3-4 cups of coffee daily    Occupational Exposure Not  Asked    Hobby Hazards Not Asked    Sleep Concern Not Asked    Stress Concern No    Weight Concern No    Special Diet No    Back Care Not Asked    Exercise Yes     Comment: 5x/week    Bike Helmet Not Asked    Seat Belt Yes    Self-Exams Not Asked   Social History Narrative    Not on file     Social Determinants of Health     Financial Resource Strain: Not on file   Food Insecurity: Not on file   Transportation Needs: Not on file   Physical Activity: Not on file   Stress: Not on file   Social Connections: Not on file   Housing Stability: Not on file        Medications:   Current Outpatient Medications   Medication Sig Dispense Refill    Budesonide-Formoterol Fumarate (SYMBICORT) 160-4.5 MCG/ACT Inhalation Aerosol Inhale 2 puffs into the lungs 2 (two) times daily. 10.2 g 5    Clobetasol Propionate 0.05 % External Gel Apply 1 Application topically 2 (two) times daily. Apply to scalp bid 30 g 3    triamcinolone 0.1 % External Cream Apply topically 2 (two) times daily as needed. 80 g 2    betamethasone dipropionate 0.05 % External Lotion APPLY TO THE AFFECTED AREA ON SCALP DAILY AS NEEDED FOR FLARES      Tadalafil (CIALIS) 20 MG Oral Tab Take 1 tablet (20 mg total) by mouth as needed for Erectile Dysfunction. 30 tablet 6    RHOPRESSA 0.02 % Ophthalmic Solution Place 1 drop into both eyes at bedtime.      latanoprost 0.005 % Ophthalmic Solution Place 1 drop into both eyes nightly.  3    dorzolamide-timolol 22.3-6.8 MG/ML Ophthalmic Solution Place 1 drop into both eyes 2 (two) times daily.         Review of Systems: Review of Systems     Physical Exam:  /68 (BP Location: Right arm, Patient Position: Sitting, Cuff Size: adult)   Pulse 57   Resp 16   Ht 5' 9\" (1.753 m)   Wt 174 lb (78.9 kg)   SpO2 99%   BMI 25.70 kg/m²      Constitutional: alert, cooperative. No acute distress.  HEENT: Head NC/AT. Nares normal. Septum midline. Mucosa normal. No drainage or sinus tenderness.  Cardio: Regular rate and rhythm.  Normal S1 and S2. No murmurs.   Respiratory: Thorax symmetrical with no labored breathing. clear to auscultation bilaterally  Extremities: No clubbing or cyanosis. No BLE edema.    Neurologic: A&Ox3. No gross motor deficits.  Skin: Warm, dry  Psych: Calm, cooperative. Pleasant affect.    Results:  Personally reviewed  MRI SHOULDER, LEFT (CPT=73221)  Exam: MRI SHOULDER LT W/O CONTRAST  CPT Code(s): 42224 - MRI JOINT UPR EXTREM W/O DYE    CLINICAL HISTORY: Shoulder pain, weakness and limited range of motion.    TECHNIQUE: Non-infused, multiplanar and multi-sequential ultrahigh field 3.0 Mary Ellen MRI of the left shoulder was performed.    COMPARISON: None.    FINDINGS:  There is an intermediate to high-grade partial thickness articular surface tear involving the anterior central fibers of the supraspinatus tendon with approximately 2 cm retraction of some of the torn articular surface fibers. There is heterogeneous   increased signal, some of which is of fluid signal intensity, in the distal subscapularis tendon superiorly. No full-thickness rotator cuff tear is identified. There is trace fluid in the subacromial/subdeltoid bursa with mild to moderate fluid in the   subcoracoid bursa.    There is somewhat ill-defined longitudinally oriented partial tearing of the intra-articular and proximal extra-articular segments of the biceps tendon long head. There is subluxation of the biceps tendon partially out of the bicipital groove and into   the substance of the subscapularis tendon distally and superiorly.    Abnormal heterogeneous ill-defined increased signal is seen within the superior labrum. There is irregular linear signal extending along the base of the posterosuperior labrum to the level of the equator. Punctate foci of fluid abuts the posterosuperior   and posteroinferior labrum.    There is no joint effusion. The glenohumeral joint space is minimally narrowed. There is mild degenerative change at the  acromioclavicular joint and a type I anterior acromion process. No fracture or focal bone lesion is identified. A small cyst is noted   in the humerus lesser tuberosity. There is subtle fatty atrophy of the teres minor muscle. The quadrilateral space appears clear.    IMPRESSION:  1. Intermediate to high-grade partial thickness articular surface supraspinatus tendon tear with mild retraction of the torn tendon margin. There is interstitial tearing and tendinosis of the subscapularis tendon distally.  2. Irregular longitudinal partial tearing of the biceps tendon long head. There is subluxation of the biceps tendon long head partially out of the bicipital groove and into the substance of the torn subscapularis tendon.  3. Degenerative tearing of the superior/posterosuperior labrum.  4. There is subtle fatty atrophy of the teres minor muscle. This can be seen with a denervation type process. The quadrilateral space appears clear, however.    This report was performed utilizing speech recognition software technology. Despite thorough proofreading, speech recognition errors could escape detection. If a word or phrase is confusing or out of context, please do not hesitate to call for   clarification.    Interpreting Radiologist:    Montez Maurer M.D.  Electronically Signed: 01/23/2024 05:37 PM      PFTs:       No data to display                   No data to display                    WBC: 3.5, done on 5/9/2024.  HGB: 12.6, done on 5/9/2024.  PLT: 211, done on 5/9/2024.     Glucose: 96, done on 5/9/2024.  Cr: 0.96, done on 5/9/2024.  Last eGFR was 88 on 5/9/2024.  CA: 9.1, done on 5/9/2024.  Na: 142, done on 5/9/2024.  K: 3.9, done on 5/9/2024.  Cl: 109, done on 5/9/2024.  CO2: 27, done on 5/9/2024.  Last ALB was 4.3% done on 5/9/2024.     No results found.     Assessment/Plan:  #1. Post covid reactive airways vs possible new onset asthma?, symptoms worsening on breo  Change back to symbicort, PA may be required, but patient  got good benefit and was clinically improving on symbicort and has now worsened back on breo  Check PFTs, mannitol challenge test if necessary  Will consider bloodwork down the line as well  All questions answered      Return in about 3 months (around 11/9/2024).    I spent 30 minutes obtaining and reviewing records, preparing for the visit including reviewing chart and prior testing, face to face time examining the patient and obtaining history, counseling, arranging and reviewing office-based testing, independently reviewing relevant studies and documentation exclusive of other billable procedures.      En Yun MD  8/9/2024

## 2024-08-12 NOTE — TELEPHONE ENCOUNTER
Received fax from [facility] stating that PRIOR AUTHORIZATION has been approved for patients Symbicort RX.    MY CHART MESSAGE sent to patient to inform of this.  Routing also to provider to inform of approval.   Sending fax to scanning.   Closing encounter at this time.

## 2024-08-14 ENCOUNTER — OFFICE VISIT (OUTPATIENT)
Dept: ORTHOPEDICS CLINIC | Facility: CLINIC | Age: 64
End: 2024-08-14
Payer: MEDICARE

## 2024-08-14 DIAGNOSIS — Z98.890 S/P ARTHROSCOPY OF SHOULDER: Primary | ICD-10-CM

## 2024-08-14 PROCEDURE — 99213 OFFICE O/P EST LOW 20 MIN: CPT | Performed by: ORTHOPAEDIC SURGERY

## 2024-08-14 NOTE — PROGRESS NOTES
[facility] ORTHOPEDICS  [address] [place] [id number]  [phone]       Name: Brice Tian   MRN: [id number]  Date: 8/14/2024     REASON FOR VISIT: Fourth Post-Surgical Visit   Surgery: Left shoulder scope, rotator cuff repair, biceps tenodesis, subacromial decompression, distal clavicle on 02/08/2024.    INTERVAL HISTORY:  Brice Tian is a 64 year old male who returns after the aforementioned procedure.  The post-operative course has been unremarkable with pain well controlled and overall progress noted.     Physical therapy was started and is progressing well. He notes that he is doing well with Gertrudis Ferguson, 0 out of 10 pain. He has returned to full activity. He notes some creaking.       PE:   There were no vitals filed for this visit.    Estimated body mass index is 25.7 kg/m² as calculated from the following:    Height as of 8/9/24: 5' 9\" (1.753 m).    Weight as of 8/9/24: 174 lb.    Physical Exam  Constitutional:       Appearance: Normal appearance.   HENT:      Head: Normocephalic and atraumatic.   Eyes:      Extraocular Movements: Extraocular movements intact.   Neck:      Musculoskeletal: Normal range of motion and neck supple.   Cardiovascular:      Pulses: Normal pulses.   Pulmonary:      Effort: Pulmonary effort is normal. No respiratory distress.   Abdominal:      General: There is no distension.   Skin:     General: Skin is warm.      Capillary Refill: Capillary refill takes less than 2 seconds.      Findings: No bruising.   Neurological:      General: No focal deficit present.      Mental Status: She is alert.   Psychiatric:         Mood and Affect: Mood normal.     Examination of the right shoulder demonstrates:     Physical examination the patient is alert and oriented x3, well-developed, well-nourished, no acute distress.     Full ROM. 5/5 strength.     Incisional sites are clean dry intact without signs of active pathology.      The  contralateral shoulder is without limitation in range of motion or strength, no positive provocative maneuvers.       Radiographic Examination/Diagnostics:    Shoulder radiography personally viewed, independently interpreted and radiology report was reviewed.    MRI SHOULDER, LEFT (CPT=73221)    Result Date: 1/23/2024  Exam: MRI SHOULDER LT W/O CONTRAST CPT Code(s): 97851 - MRI JOINT UPR EXTREM W/O DYE CLINICAL HISTORY: Shoulder pain, weakness and limited range of motion. TECHNIQUE: Non-infused, multiplanar and multi-sequential ultrahigh field 3.0 Mary Ellen MRI of the left shoulder was performed. COMPARISON: None. FINDINGS: There is an intermediate to high-grade partial thickness articular surface tear involving the anterior central fibers of the supraspinatus tendon with approximately 2 cm retraction of some of the torn articular surface fibers. There is heterogeneous increased signal, some of which is of fluid signal intensity, in the distal subscapularis tendon superiorly. No full-thickness rotator cuff tear is identified. There is trace fluid in the subacromial/subdeltoid bursa with mild to moderate fluid in the subcoracoid bursa. There is somewhat ill-defined longitudinally oriented partial tearing of the intra-articular and proximal extra-articular segments of the biceps tendon long head. There is subluxation of the biceps tendon partially out of the bicipital groove and into the substance of the subscapularis tendon distally and superiorly. Abnormal heterogeneous ill-defined increased signal is seen within the superior labrum. There is irregular linear signal extending along the base of the posterosuperior labrum to the level of the equator. Punctate foci of fluid abuts the posterosuperior and posteroinferior labrum. There is no joint effusion. The glenohumeral joint space is minimally narrowed. There is mild degenerative change at the acromioclavicular joint and a type I anterior acromion process. No fracture or  focal bone lesion is identified. A small cyst is noted  in the humerus lesser tuberosity. There is subtle fatty atrophy of the teres minor muscle. The quadrilateral space appears clear. IMPRESSION: 1. Intermediate to high-grade partial thickness articular surface supraspinatus tendon tear with mild retraction of the torn tendon margin. There is interstitial tearing and tendinosis of the subscapularis tendon distally. 2. Irregular longitudinal partial tearing of the biceps tendon long head. There is subluxation of the biceps tendon long head partially out of the bicipital groove and into the substance of the torn subscapularis tendon. 3. Degenerative tearing of the superior/posterosuperior labrum. 4. There is subtle fatty atrophy of the teres minor muscle. This can be seen with a denervation type process. The quadrilateral space appears clear, however. This report was performed utilizing speech recognition software technology. Despite thorough proofreading, speech recognition errors could escape detection. If a word or phrase is confusing or out of context, please do not hesitate to call for clarification. Interpreting Radiologist: Montez Maurer M.D. Electronically Signed: 01/23/2024 05:37 PM    XR SHOULDER, COMPLETE (MIN 2 VIEWS), LEFT (CPT=73030)    Result Date: 1/15/2024  PROCEDURE:  XR SHOULDER, COMPLETE (MIN 2 VIEWS), LEFT (CPT=73030)  TECHNIQUE:  Multiple views were obtained.  COMPARISON:  CAROLINE, XR, XR SHOULDER, COMPLETE (MIN 2 VIEWS), LEFT (CPT=73030), 2/18/2023, 10:28 AM.  INDICATIONS:  M25.512 Left shoulder pain, unspecified chronicity  PATIENT STATED HISTORY: (As transcribed by Technologist)  Ortho consult. Patient states left shoulder pain for 1 month.    FINDINGS: No fracture or dislocation. Appropriate internal and external rotation is demonstrated. Left glenohumeral joint space is well maintained.  Subacromial osteophytes noted.  Minimal hypertrophic changes of the left acromioclavicular joint.    IMPRESSION: Mild osteoarthritic changes in the left shoulder.  Subacromial osteophytes noted.  Possibility of impingement cannot be excluded.   LOCATION:  [facility]   Dictated by (CST): Jose Daniel Lynch MD on 1/15/2024 at 1:52 PM     Finalized by (CST): Jose Daniel Lynch MD on 1/15/2024 at 1:52 PM         IMPRESSION: Brice Tian is a 64 year old male who presents 6 months s/p Left shoulder scope, rotator cuff repair, biceps tenodesis, subacromial decompression, distal clavicle on 02/08/2024.    PLAN:   It has been a pleasure to take care of him!    All questions were answered appropriately and the patient was in agreement with the treatment plan.     FOLLOW-UP:  As needed, no imaging needed.         Tabatha Fair MD  Knee, Shoulder, & Elbow Surgery / Sports Medicine Specialist  Orthopaedic Surgery  [address] [place] [address].org  [email or web address]  t: [phone]  o: [phone]  f: [phone]     This note was dictated using Dragon software.  While it was briefly proofread prior to completion, some grammatical, spelling, and word choice errors due to dictation may still occur.

## 2024-08-22 ENCOUNTER — LAB ENCOUNTER (OUTPATIENT)
Dept: LAB | Age: 64
End: 2024-08-22
Attending: INTERNAL MEDICINE
Payer: MEDICARE

## 2024-08-22 DIAGNOSIS — E78.00 PURE HYPERCHOLESTEROLEMIA: Chronic | ICD-10-CM

## 2024-08-22 DIAGNOSIS — D64.9 NORMOCYTIC ANEMIA: ICD-10-CM

## 2024-08-22 LAB
BASOPHILS # BLD AUTO: 0.02 X10(3) UL (ref 0–0.2)
BASOPHILS NFR BLD AUTO: 0.6 %
CHOLEST SERPL-MCNC: 213 MG/DL (ref ?–200)
DEPRECATED HBV CORE AB SER IA-ACNC: 42.3 NG/ML
EOSINOPHIL # BLD AUTO: 0.18 X10(3) UL (ref 0–0.7)
EOSINOPHIL NFR BLD AUTO: 5.1 %
ERYTHROCYTE [DISTWIDTH] IN BLOOD BY AUTOMATED COUNT: 14.2 %
FASTING PATIENT LIPID ANSWER: YES
HCT VFR BLD AUTO: 43.4 %
HDLC SERPL-MCNC: 55 MG/DL (ref 40–59)
HGB BLD-MCNC: 15.5 G/DL
IMM GRANULOCYTES # BLD AUTO: 0.01 X10(3) UL (ref 0–1)
IMM GRANULOCYTES NFR BLD: 0.3 %
IRON SATN MFR SERPL: 37 %
IRON SERPL-MCNC: 110 UG/DL
LDLC SERPL CALC-MCNC: 148 MG/DL (ref ?–100)
LYMPHOCYTES # BLD AUTO: 1.23 X10(3) UL (ref 1–4)
LYMPHOCYTES NFR BLD AUTO: 34.6 %
MCH RBC QN AUTO: 30.8 PG (ref 26–34)
MCHC RBC AUTO-ENTMCNC: 35.7 G/DL (ref 31–37)
MCV RBC AUTO: 86.3 FL
MONOCYTES # BLD AUTO: 0.41 X10(3) UL (ref 0.1–1)
MONOCYTES NFR BLD AUTO: 11.5 %
NEUTROPHILS # BLD AUTO: 1.7 X10 (3) UL (ref 1.5–7.7)
NEUTROPHILS # BLD AUTO: 1.7 X10(3) UL (ref 1.5–7.7)
NEUTROPHILS NFR BLD AUTO: 47.9 %
NONHDLC SERPL-MCNC: 158 MG/DL (ref ?–130)
PLATELET # BLD AUTO: 173 10(3)UL (ref 150–450)
RBC # BLD AUTO: 5.03 X10(6)UL
TOTAL IRON BINDING CAPACITY: 301 UG/DL (ref 250–425)
TRANSFERRIN SERPL-MCNC: 231 MG/DL (ref 215–365)
TRIGL SERPL-MCNC: 55 MG/DL (ref 30–149)
VLDLC SERPL CALC-MCNC: 10 MG/DL (ref 0–30)
WBC # BLD AUTO: 3.6 X10(3) UL (ref 4–11)

## 2024-08-22 PROCEDURE — 82728 ASSAY OF FERRITIN: CPT

## 2024-08-22 PROCEDURE — 36415 COLL VENOUS BLD VENIPUNCTURE: CPT

## 2024-08-22 PROCEDURE — 83540 ASSAY OF IRON: CPT

## 2024-08-22 PROCEDURE — 83550 IRON BINDING TEST: CPT

## 2024-08-22 PROCEDURE — 85025 COMPLETE CBC W/AUTO DIFF WBC: CPT

## 2024-08-22 PROCEDURE — 80061 LIPID PANEL: CPT

## 2024-08-23 DIAGNOSIS — E78.00 PURE HYPERCHOLESTEROLEMIA: Primary | Chronic | ICD-10-CM

## 2024-08-23 DIAGNOSIS — I25.10 CORONARY ARTERY CALCIFICATION: ICD-10-CM

## 2024-08-23 RX ORDER — ATORVASTATIN CALCIUM 10 MG/1
10 TABLET, FILM COATED ORAL NIGHTLY
Qty: 90 TABLET | Refills: 1 | Status: SHIPPED | OUTPATIENT
Start: 2024-08-23

## 2024-10-09 ENCOUNTER — HOSPITAL ENCOUNTER (OUTPATIENT)
Age: 64
Discharge: HOME | End: 2024-10-09
Payer: MEDICARE

## 2024-10-09 VITALS
HEIGHT: 69 IN | HEART RATE: 84 BPM | SYSTOLIC BLOOD PRESSURE: 123 MMHG | TEMPERATURE: 99 F | RESPIRATION RATE: 18 BRPM | OXYGEN SATURATION: 97 % | BODY MASS INDEX: 25.18 KG/M2 | DIASTOLIC BLOOD PRESSURE: 64 MMHG | WEIGHT: 170 LBS

## 2024-10-09 DIAGNOSIS — J40 SINOBRONCHITIS: Primary | ICD-10-CM

## 2024-10-09 DIAGNOSIS — J32.9 SINOBRONCHITIS: Primary | ICD-10-CM

## 2024-10-09 LAB — SARS-COV-2 RNA RESP QL NAA+PROBE: NOT DETECTED

## 2024-10-09 PROCEDURE — 99214 OFFICE O/P EST MOD 30 MIN: CPT

## 2024-10-09 PROCEDURE — 99213 OFFICE O/P EST LOW 20 MIN: CPT

## 2024-10-09 RX ORDER — DOXYCYCLINE 100 MG/1
100 CAPSULE ORAL 2 TIMES DAILY
Qty: 14 CAPSULE | Refills: 0 | Status: SHIPPED | OUTPATIENT
Start: 2024-10-09 | End: 2024-10-16

## 2024-10-09 NOTE — ED PROVIDER NOTES
Patient Seen in: [facility]      History     Chief Complaint   Patient presents with    Cough     Stated Complaint: COLD    Subjective:   HPI  64-year-old male with allergic rhinitis, hyperlipidemia, and glaucoma presents complaining of 2 days of nasal congestion with sinus maxillary pressure as well as a sore throat and cough.  He denies any fever.  He flew over the weekend.    Objective:     Past Medical History:    Allergic rhinitis    Attention deficit disorder without mention of hyperactivity    Glaucoma    Hyperlipidemia    Prostate cancer (HCC)              Past Surgical History:   Procedure Laterality Date    Anesth,surgery of shoulder Left     Colonoscopy      Other surgical history  04/30/2014    prostate biopsy dr larry     Other surgical history  08/05/2014    Seeds                 No pertinent social history.            Review of Systems   All other systems reviewed and are negative.      Positive for stated complaint: COLD  Other systems are as noted in HPI.  Constitutional and vital signs reviewed.      All other systems reviewed and negative except as noted above.    Physical Exam     ED Triage Vitals [10/09/24 1457]   /64   Pulse 84   Resp 18   Temp 98.7 °F (37.1 °C)   Temp src Temporal   SpO2 97 %   O2 Device None (Room air)       Current Vitals:   Vital Signs  BP: 123/64  Pulse: 84  Resp: 18  Temp: 98.7 °F (37.1 °C)  Temp src: Temporal    Oxygen Therapy  SpO2: 97 %  O2 Device: None (Room air)        Physical Exam  Vitals and nursing note reviewed.   Constitutional:       General: He is not in acute distress.     Appearance: He is well-developed. He is not ill-appearing or toxic-appearing.   HENT:      Right Ear: Tympanic membrane, ear canal and external ear normal.      Left Ear: Tympanic membrane, ear canal and external ear normal.      Nose: Congestion present. No rhinorrhea.      Comments: Bilateral maxillary sinus tenderness     Mouth/Throat:      Pharynx: No  Thanks! oropharyngeal exudate or posterior oropharyngeal erythema.   Cardiovascular:      Rate and Rhythm: Normal rate and regular rhythm.      Heart sounds: Normal heart sounds.   Pulmonary:      Effort: Pulmonary effort is normal. No respiratory distress.      Breath sounds: Normal breath sounds. No wheezing.   Skin:     General: Skin is warm and dry.   Neurological:      Mental Status: He is alert and oriented to person, place, and time.             ED Course     Labs Reviewed   RAPID SARS-COV-2 BY PCR - Normal                   MDM     Medical Decision Making  64-year-old male with allergic rhinitis, hyperlipidemia, and glaucoma presents complaining of 2 days of nasal congestion with sinus maxillary pressure as well as a sore throat and cough.  He denies any fever.  He flew over the weekend.    Pertinent Labs & Imaging studies reviewed. (See chart for details).  Patient coming in with viral symptoms.   Differential diagnosis includes but not limited to virus, COVID, sinusitis, bronchitis, pneumonia  Labs reviewed COVID-negative.   Will treat for sinobronchitis.  Will discharge on Mucinex, Tylenol/Motrin, and doxycycline to hold. Patient/Parent is comfortable with this plan.    Overall Pt looks good. Non-toxic, well-hydrated and in no respiratory distress. Vital signs are reassuring. Exam is reassuring. I do not believe pt requires and additional diagnostic studies or intervention. I believe pt can be discharged home to continue evaluation as an outpatient. Follow-up provider given. Discharge instructions given and reviewed. Return for any problems. All understand and agree with the plan.        Problems Addressed:  Sinobronchitis: acute illness or injury        Disposition and Plan     Clinical Impression:  1. Sinobronchitis         Disposition:  Discharge  10/9/2024  3:28 pm    Follow-up:  No follow-up provider specified.        Medications Prescribed:  Discharge Medication List as of 10/9/2024  3:30 PM        START  taking these medications    Details   doxycycline 100 MG Oral Cap Take 1 capsule (100 mg total) by mouth 2 (two) times daily for 7 days., Normal, Disp-14 capsule, R-0                 Supplementary Documentation:

## 2024-10-09 NOTE — DISCHARGE INSTRUCTIONS
Take Mucinex D for congestion and Tylenol/Motrin for pain.  If symptoms persist take antibiotic after a week.

## 2024-10-09 NOTE — ED INITIAL ASSESSMENT (HPI)
Pt here c/o cough, congestion, sore throat, runny nose since yesterday.   Denies fever.  Denies cp or sob with cough.

## 2024-10-22 ENCOUNTER — RT VISIT (OUTPATIENT)
Dept: RESPIRATORY THERAPY | Facility: HOSPITAL | Age: 64
End: 2024-10-22
Attending: INTERNAL MEDICINE
Payer: MEDICARE

## 2024-10-22 DIAGNOSIS — J45.40 MODERATE PERSISTENT ASTHMA WITHOUT COMPLICATION (HCC): ICD-10-CM

## 2024-10-22 PROCEDURE — 94726 PLETHYSMOGRAPHY LUNG VOLUMES: CPT | Performed by: INTERNAL MEDICINE

## 2024-10-22 PROCEDURE — 94729 DIFFUSING CAPACITY: CPT | Performed by: INTERNAL MEDICINE

## 2024-10-22 PROCEDURE — 94010 BREATHING CAPACITY TEST: CPT | Performed by: INTERNAL MEDICINE

## 2024-10-24 NOTE — PROCEDURES
Pulmonary Function Test Report  Findings:  Prebronchodilator FEV1 is 4.25L, z-score 2.42.  Prebronchodilator FVC is 5.13L, z-score 1.71.                           FEV1/ FVC ratio is 83 %, z-score 0.82.   The flow-volume loop demonstrates a normal pattern.  The TLC is 7.43L, z-score -0.55. The residual volume 2.11L, z-score -0.30.   The diffusion capacity is 28.77, z-score 0.60 and 0.14 when corrected for alveolar volume.     Impression:  Spirometry is normal.  Lung volumes are normal.   Diffusion capacity is normal.  Disclaimer: This PFT has been interpreted based on Z-score/LLN/ULN criteria as described in ATS guidelines 2022.   En Yun MD  [facility] Pulmonary Medicine  Office: [phone]

## 2024-11-05 ENCOUNTER — HOSPITAL ENCOUNTER (OUTPATIENT)
Age: 64
Discharge: HOME | End: 2024-11-05
Payer: MEDICARE

## 2024-11-05 VITALS
SYSTOLIC BLOOD PRESSURE: 107 MMHG | DIASTOLIC BLOOD PRESSURE: 71 MMHG | TEMPERATURE: 98 F | RESPIRATION RATE: 18 BRPM | HEART RATE: 66 BPM | OXYGEN SATURATION: 100 %

## 2024-11-05 DIAGNOSIS — H61.23 BILATERAL IMPACTED CERUMEN: Primary | ICD-10-CM

## 2024-11-05 PROCEDURE — 99212 OFFICE O/P EST SF 10 MIN: CPT

## 2024-11-05 PROCEDURE — 69209 REMOVE IMPACTED EAR WAX UNI: CPT

## 2024-11-05 NOTE — DISCHARGE INSTRUCTIONS
You have declined curette use to disimpact cerumen and stated that you will go to an ear nose and throat specialist to have earwax removed.  This is fine.  Debrox has been sent to your pharmacy to continue to help loosen up the impacted wax you had bilaterally.

## 2024-11-05 NOTE — ED PROVIDER NOTES
Patient Seen in: [facility]      History     Chief Complaint   Patient presents with    Ear Problem Pain     Stated Complaint: ears clogged    Subjective:   HPI      64-year-old male presents today with complaints of earwax impaction bilaterally.  Patient states he was wearing earplugs and states that now his hearing has decreased after using earplugs.    Objective:     Past Medical History:    Allergic rhinitis    Attention deficit disorder without mention of hyperactivity    Glaucoma    Hyperlipidemia    Prostate cancer (HCC)              Past Surgical History:   Procedure Laterality Date    Anesth,surgery of shoulder Left     Colonoscopy      Other surgical history  04/30/2014    prostate biopsy dr larry     Other surgical history  08/05/2014    Seeds                 Social History     Socioeconomic History    Marital status:    Tobacco Use    Smoking status: Never    Smokeless tobacco: Never    Tobacco comments:     None   Vaping Use    Vaping status: Never Used   Substance and Sexual Activity    Alcohol use: No    Drug use: Never    Sexual activity: Yes   Other Topics Concern    Caffeine Concern Yes     Comment: 3-4 cups of coffee daily    Stress Concern No    Weight Concern No    Special Diet No    Exercise Yes     Comment: 5x/week    Seat Belt Yes              Review of Systems   Constitutional: Negative.    HENT:  Positive for hearing loss.    Eyes: Negative.    Respiratory: Negative.     Cardiovascular: Negative.    Gastrointestinal: Negative.    Endocrine: Negative.    Genitourinary: Negative.    Musculoskeletal: Negative.    Skin: Negative.    Allergic/Immunologic: Negative.    Neurological: Negative.    Hematological: Negative.    Psychiatric/Behavioral: Negative.         Positive for stated complaint: ears clogged  Other systems are as noted in HPI.  Constitutional and vital signs reviewed.      All other systems reviewed and negative except as noted above.    Physical Exam      ED Triage Vitals [11/05/24 1036]   /71   Pulse 66   Resp 18   Temp 97.7 °F (36.5 °C)   Temp src Oral   SpO2 100 %   O2 Device None (Room air)       Current Vitals:   Vital Signs  BP: 107/71  Pulse: 66  Resp: 18  Temp: 97.7 °F (36.5 °C)  Temp src: Oral    Oxygen Therapy  SpO2: 100 %  O2 Device: None (Room air)        Physical Exam  Vitals and nursing note reviewed.   Constitutional:       Appearance: Normal appearance. He is normal weight.   HENT:      Head: Normocephalic.      Right Ear: There is impacted cerumen.      Left Ear: There is impacted cerumen.      Ears:      Comments: After ear irrigation right TM is visible and intact: left TM is still impacted with wax.  Eyes:      Extraocular Movements: Extraocular movements intact.      Conjunctiva/sclera: Conjunctivae normal.      Pupils: Pupils are equal, round, and reactive to light.   Pulmonary:      Effort: Pulmonary effort is normal.   Musculoskeletal:      Cervical back: Normal range of motion and neck supple.   Neurological:      General: No focal deficit present.      Mental Status: He is alert.   Psychiatric:         Mood and Affect: Mood normal.           ED Course   Labs Reviewed - No data to display  Earwax irrigation performed by PCT.  Right TM is visible and intact after irrigation with scant wax.  It is not around the canal.  Left ear still impacted with wax however the wax appears more moist.  Patient has declined curette use to further remove the cerumen.              MDM      64-year-old male presents today with complaints of earwax impaction bilaterally.  Patient states he was wearing earplugs and states that now his hearing has decreased after using earplugs.  Vital signs: Please see EMR.  Physical exam: Please see exam.  Differential diagnosis: Cerumen impaction, otitis externa, otalgia.  Based on physical exam and HPI will diagnosed with cerumen impaction.  Patient has declined further intervention to remove earwax and states to want  to go to an ear nose and throat specialist.  I prescribed Debrox eardrops sent to patient's pharmacy.        Medical Decision Making  64-year-old male presents today with complaints of earwax impaction bilaterally.  Patient states he was wearing earplugs and states that now his hearing has decreased after using earplugs.    Problems Addressed:  Bilateral impacted cerumen: acute illness or injury    Amount and/or Complexity of Data Reviewed  ECG/medicine tests: ordered. Decision-making details documented in ED Course.    Risk  OTC drugs.        Disposition and Plan     Clinical Impression:  1. Bilateral impacted cerumen         Disposition:  Discharge  11/5/2024 11:32 am    Follow-up:  Kathy Saldaña MD  [address] [facility] [address] [phone]    In 1 week      Jai Kumar MD  [address] [facility] [address] [phone]                Medications Prescribed:  Current Discharge Medication List        START taking these medications    Details   carbamide peroxide 6.5 % Otic Solution Place 5 drops into both ears 2 (two) times daily for 7 days.  Qty: 1 each, Refills: 0                 Supplementary Documentation:

## 2025-02-19 DIAGNOSIS — R05.3 CHRONIC COUGH: ICD-10-CM

## 2025-02-19 DIAGNOSIS — J45.20 MILD INTERMITTENT ASTHMA WITHOUT COMPLICATION (HCC): ICD-10-CM

## 2025-02-19 RX ORDER — BUDESONIDE AND FORMOTEROL FUMARATE DIHYDRATE 80; 4.5 UG/1; UG/1
2 AEROSOL RESPIRATORY (INHALATION) 2 TIMES DAILY
Qty: 10.2 G | Refills: 1 | Status: SHIPPED | OUTPATIENT
Start: 2025-02-19

## 2025-02-19 NOTE — TELEPHONE ENCOUNTER
Received request for:  Budesonide formoterol 80-4.5    Patient last seen by: Dr. Yun on 8-9-24    Has scheduled appointment: none    Physician recommendation:  Change back to Symbicort.  Pt advised to follow up in 3 months.    Refill for Budesonide formoterol sent to pharmacy.  VideoLenst message sent to pt to remind him to schedule a follow up appt.

## 2025-06-25 PROBLEM — C61 MALIGNANT NEOPLASM OF PROSTATE (HCC): Status: RESOLVED | Noted: 2025-06-25 | Resolved: 2025-06-25

## 2025-07-03 ENCOUNTER — OFFICE VISIT (OUTPATIENT)
Dept: INTERNAL MEDICINE CLINIC | Facility: CLINIC | Age: 65
End: 2025-07-03
Payer: MEDICARE

## 2025-07-03 VITALS
DIASTOLIC BLOOD PRESSURE: 63 MMHG | RESPIRATION RATE: 12 BRPM | BODY MASS INDEX: 25.09 KG/M2 | TEMPERATURE: 98 F | HEIGHT: 68.75 IN | WEIGHT: 169.38 LBS | OXYGEN SATURATION: 99 % | SYSTOLIC BLOOD PRESSURE: 115 MMHG | HEART RATE: 55 BPM

## 2025-07-03 DIAGNOSIS — J45.40 MODERATE PERSISTENT ASTHMA WITHOUT COMPLICATION (HCC): ICD-10-CM

## 2025-07-03 DIAGNOSIS — Z23 NEED FOR SHINGLES VACCINE: ICD-10-CM

## 2025-07-03 DIAGNOSIS — Z00.00 ENCOUNTER FOR SUBSEQUENT ANNUAL WELLNESS VISIT (AWV) IN MEDICARE PATIENT: Primary | ICD-10-CM

## 2025-07-03 DIAGNOSIS — E78.00 PURE HYPERCHOLESTEROLEMIA: Chronic | ICD-10-CM

## 2025-07-03 DIAGNOSIS — Z85.46 HISTORY OF PROSTATE CANCER: ICD-10-CM

## 2025-07-03 DIAGNOSIS — Z23 NEED FOR PNEUMOCOCCAL VACCINATION: ICD-10-CM

## 2025-07-03 DIAGNOSIS — H40.1133 PRIMARY OPEN ANGLE GLAUCOMA (POAG) OF BOTH EYES, SEVERE STAGE: Chronic | ICD-10-CM

## 2025-07-03 DIAGNOSIS — Z12.11 SCREENING FOR MALIGNANT NEOPLASM OF COLON: ICD-10-CM

## 2025-07-03 DIAGNOSIS — R05.3 CHRONIC COUGH: ICD-10-CM

## 2025-07-03 PROCEDURE — 90677 PCV20 VACCINE IM: CPT | Performed by: INTERNAL MEDICINE

## 2025-07-03 PROCEDURE — G0009 ADMIN PNEUMOCOCCAL VACCINE: HCPCS | Performed by: INTERNAL MEDICINE

## 2025-07-03 PROCEDURE — G0439 PPPS, SUBSEQ VISIT: HCPCS | Performed by: INTERNAL MEDICINE

## 2025-07-03 NOTE — PATIENT INSTRUCTIONS
- Get blood tests done when fasting (8 hours fast).    - Pneumonia shot given today.  You do not need any more pneumonia shots after this one.  - Get shingles (Shingrix) shots at your local pharmacy  - For your cough, continue with Symbicort inhaler.  Also start over the counter Azelastine (Astelin/Astapro) nasal spray.  Use twice daily  - Also take a nightly over the counter allergy tablet (cetirizine, loratadine, fexofenadine, or levocetirizine).      It was a pleasure seeing you in the clinic today.  Thank you for choosing the [facility] [place] office for your healthcare needs. Please call at [phone] with any questions or concerns.    Kathy Saldaña MD

## 2025-07-03 NOTE — PROGRESS NOTES
Subjective:   Brice Tian is a 65 year old male who presents for a Subsequent Annual Wellness visit (Pt already had Initial Annual Wellness) and scheduled follow up of multiple significant but stable problems.   Preventative health - due for updated labs, immunizations, colonoscopy.  Hypercholesterolemia - stopped atorvastatin after 1 month of taking it due to myalgias.  Asthma, chronic cough - following with Pulmonology; on Symbicort.  Cough still persistent but breathing is better.  Primary open angle glaucoma - following regularly with Ophthalmology.    History/Other:   Fall Risk Assessment:   He has been screened for Falls and is low risk.      Cognitive Assessment:   He had a completely normal cognitive assessment - see flowsheet entries       Functional Ability/Status:   Brice Tian has some abnormal functions as listed below:  He has Vision problems based on screening of functional status.       Depression Screening (PHQ):  PHQ-2 SCORE: 0  , done 7/3/2025   Last [place] Suicide Screening on 7/3/2025 was No Risk.          Advanced Directives:   He does have a Living Will but we do NOT have it on file in Epic.    He does have a POA but we do NOT have it on file in [employer].    Patient has Advance Care Planning documents but we do not have a copy in EMR. Discussed Advanced Care Planning with patient and instructed patient to get our office a copy to be scanned into EMR.      Problem List[1]  Allergies:  He is allergic to penicillins and atorvastatin.    Current Medications:  Active Meds, Sig Only[2]    Medical History:  He  has a past medical history of Allergic rhinitis (May 2021), Attention deficit disorder without mention of hyperactivity (2011), Glaucoma, Hyperlipidemia (2020), and Prostate cancer (HCC).  Surgical History:  He  has a past surgical history that includes other surgical history (04/30/2014); other surgical history (08/05/2014); colonoscopy; and anesth,surgery of shoulder (Left).    Family History:  His family history includes Alcohol and Other Disorders Associated in his father; Cancer in his brother and father; Diabetes in his father; Glaucoma in his mother and sister; Heart Attack in his brother; Heart Disease in his paternal grandfather; Heart Disorder in his brother; west Nile Fever in his mother.  Social History:  He  reports that he has never smoked. He has never used smokeless tobacco. He reports that he does not drink alcohol and does not use drugs.    Tobacco:  He has never smoked tobacco.    CAGE Alcohol Screen:   CAGE screening score of 0 on 7/3/2025, showing low risk of alcohol abuse.      Patient Care Team:  Kathy Saldaña MD as PCP - General (Internal Medicine)  Maurice Ferguson MD as Consulting Physician (UROLOGY)  Harsh Garcia MD as Consulting Physician (GASTROENTEROLOGY)  Jonathan Brian MD (DERMATOLOGY)    Review of Systems  GENERAL: feels well otherwise  SKIN: denies any unusual skin lesions  EYES: denies blurred vision or double vision  HEENT: occasional nasal congestion; denies sinus pain or ST  LUNGS: chronic cough; denies shortness of breath with exertion  CARDIOVASCULAR: denies chest pain on exertion  GI: denies abdominal pain, denies heartburn  : 0 per night nocturia, no complaint of urinary incontinence  MUSCULOSKELETAL: denies acute back pain  NEURO: denies headaches  PSYCHE: denies depression or anxiety  HEMATOLOGIC: hx of anemia  ENDOCRINE: denies thyroid history  ALL/ASTHMA: denies hx of allergy or asthma    Objective:   Physical Exam  /63 (BP Location: Left arm, Patient Position: Sitting, Cuff Size: adult)   Pulse 55   Temp 98 °F (36.7 °C) (Temporal)   Resp 12   Ht 5' 8.75\" (1.746 m)   Wt 169 lb 6.4 oz (76.8 kg)   SpO2 99%   BMI 25.20 kg/m²  Estimated body mass index is 25.2 kg/m² as calculated from the following:    Height as of this encounter: 5' 8.75\" (1.746 m).    Weight as of this encounter: 169 lb 6.4 oz (76.8 kg).  Medicare Hearing  Assessment:   Hearing Screening    Time taken: 7/3/2025 11:25 AM  Entry User: Patricia Nieves CMA  Screening Method: Finger Rub  Finger Rub Result: Pass         GENERAL: Alert and oriented, well developed, well nourished,in no apparent distress  SKIN: no rashes,no suspicious lesions  HEENT: atraumatic, PERRLA, EOMI, normal lid and conjunctiva  NECK: supple, no jvd, no thyromegaly  LUNGS: clear to auscultation bilaterally, no wheezing/rubs  CARDIO: RRR without murmurs.  No clubbing, cyanosis or edema.  GI: soft non tender nondistended no hepatosplenomegaly, bowel sounds throughout  NEURO: CN II-XII intact, 5/5 strength all extremities  MS: Full ROM  PSYCH: pleasant, appropriate mood and affect    Assessment & Plan:   Brice Tian is a 65 year old male who presents for a Medicare Assessment.   1. Encounter for subsequent annual wellness visit (AWV) in Medicare patient  2. History of prostate cancer  3. Screening for malignant neoplasm of colon  4. Need for pneumococcal vaccination  5. Need for shingles vaccine  Age appropriate health guidance and counseling provided.  Screening labs ordered as below.  Prevnar 20 administered in office today.  Recommend he get Shingrix shots at his pharmacy. Colonoscopy scheduled for later this month. Body mass index is 25.2 kg/m².  - CBC With Differential With Platelet; Future  - Comp Metabolic Panel (14); Future  - Lipid Panel; Future  - PSA Total, Diagnostic; Future  - Prevnar 20 (PCV20) [44186]    6. Pure hypercholesterolemia  Lifestyle controlled.  Atorvastatin prescribed last year but patient discontinued after 1 month due to myalgias.  Very mild coronary artery calcification on heart scan from 2023.  Apolipoprotein B levels high in 2023.  May consider ezetimibe if lipids remain high.  - Lipid Panel; Future    7. Moderate persistent asthma without complication (HCC)  8. Chronic cough  Following with Pulmonology (Dr. Yun).  On Symbicort, which does help.  Still with  chronic cough though.  Allergy blood tests unremarkable in 2023.  Recommend he still try OTC azelastine nasal spray and qhs OTC antihistamine to see if that helps cough.    9. Primary open angle glaucoma (POAG) of both eyes, severe stage  Following regularly with Ophthalmology (Dr. Torres/[facility]).  On prescription eye drops.    The patient indicates understanding of these issues and agrees to the plan.  Reinforced healthy diet, lifestyle, and exercise.      Return in about 1 year (around 7/3/2026).     Kathy Saldaña MD, 7/3/2025     Supplementary Documentation:   General Health:  In the past six months, have you lost more than 10 pounds without trying?: 2 - No  Has your appetite been poor?: No  Type of Diet: Balanced  How does the patient maintain a good energy level?: Appropriate Exercise, Daily Walks, Stretching  How would you describe your daily physical activity?: Moderate  How would you describe your current health state?: Good  How do you maintain positive mental well-being?: Visiting Friends, Social Interaction (Daily meditation, biking, swimming, walking.)  On a scale of 0 to 10, with 0 being no pain and 10 being severe pain, what is your pain level?: 0 - (None)  In the past six months, have you experienced urine leakage?: 0-No  At any time do you feel concerned for the safety/well-being of yourself and/or your children, in your home or elsewhere?: No  Have you had any immunizations at another office such as Influenza, Hepatitis B, Tetanus, or Pneumococcal?: No    Health Maintenance   Topic Date Due    Pneumococcal Vaccine: 50+ Years (1 of 2 - PCV) Never done    Zoster Vaccines (1 of 2) Never done    COVID-19 Vaccine (4 - 2024-25 season) 09/01/2024    Annual Depression Screening  01/01/2025    Fall Risk Screening (Annual)  01/01/2025    Colorectal Cancer Screening  03/10/2025    PSA  05/09/2025    Annual Physical  05/16/2025    Influenza Vaccine (1) 10/01/2025    Meningococcal B Vaccine  Aged Out             [1]   Patient Active Problem List  Diagnosis    History of prostate cancer    Combined arterial insufficiency and corporo-venous occlusive erectile dysfunction    Primary open angle glaucoma (POAG) of both eyes, severe stage    History of brachytherapy    Multiple pulmonary nodules determined by CT lung seen on 3/2018 exam    Cervical stenosis of spinal canal    Degeneration of lumbar or lumbosacral intervertebral disc    Personal history of colonic polyps    Polyp of colon    Enlarged prostate with lower urinary tract symptoms (LUTS)    Eczema    PVC's (premature ventricular contractions)    Pure hypercholesterolemia    Coronary artery calcification    Chronic cough    Normocytic anemia    Trigger finger, right ring finger    Moderate persistent asthma without complication (HCC)   [2]   Outpatient Medications Marked as Taking for the 7/3/25 encounter (Office Visit) with Kathy Saldaña MD   Medication Sig    Budesonide-Formoterol Fumarate 80-4.5 MCG/ACT Inhalation Aerosol INHALE 2 PUFFS INTO THE LUNGS TWICE DAILY    Budesonide-Formoterol Fumarate (SYMBICORT) 160-4.5 MCG/ACT Inhalation Aerosol Inhale 2 puffs into the lungs 2 (two) times daily.    triamcinolone 0.1 % External Cream Apply topically 2 (two) times daily as needed.    betamethasone dipropionate 0.05 % External Lotion APPLY TO THE AFFECTED AREA ON SCALP DAILY AS NEEDED FOR FLARES    Tadalafil (CIALIS) 20 MG Oral Tab Take 1 tablet (20 mg total) by mouth as needed for Erectile Dysfunction.    latanoprost 0.005 % Ophthalmic Solution Place 1 drop into both eyes nightly.    dorzolamide-timolol 22.3-6.8 MG/ML Ophthalmic Solution Place 1 drop into both eyes 2 (two) times daily.

## 2025-07-09 ENCOUNTER — LAB ENCOUNTER (OUTPATIENT)
Dept: LAB | Age: 65
End: 2025-07-09
Attending: INTERNAL MEDICINE
Payer: MEDICARE

## 2025-07-09 DIAGNOSIS — I25.10 CORONARY ARTERY CALCIFICATION: ICD-10-CM

## 2025-07-09 DIAGNOSIS — Z00.00 ENCOUNTER FOR SUBSEQUENT ANNUAL WELLNESS VISIT (AWV) IN MEDICARE PATIENT: ICD-10-CM

## 2025-07-09 DIAGNOSIS — E78.00 PURE HYPERCHOLESTEROLEMIA: Chronic | ICD-10-CM

## 2025-07-09 DIAGNOSIS — Z85.46 HISTORY OF PROSTATE CANCER: ICD-10-CM

## 2025-07-09 DIAGNOSIS — E78.00 PURE HYPERCHOLESTEROLEMIA: Primary | Chronic | ICD-10-CM

## 2025-07-09 LAB
ALBUMIN SERPL-MCNC: 4.3 G/DL (ref 3.2–4.8)
ALBUMIN/GLOB SERPL: 1.7 {RATIO} (ref 1–2)
ALP LIVER SERPL-CCNC: 59 U/L (ref 45–117)
ALT SERPL-CCNC: 14 U/L (ref 10–49)
ANION GAP SERPL CALC-SCNC: 3 MMOL/L (ref 0–18)
AST SERPL-CCNC: 19 U/L (ref ?–34)
BASOPHILS # BLD AUTO: 0.03 X10(3) UL (ref 0–0.2)
BASOPHILS NFR BLD AUTO: 0.7 %
BILIRUB SERPL-MCNC: 0.8 MG/DL (ref 0.2–1.1)
BUN BLD-MCNC: 16 MG/DL (ref 9–23)
CALCIUM BLD-MCNC: 9.7 MG/DL (ref 8.7–10.6)
CHLORIDE SERPL-SCNC: 109 MMOL/L (ref 98–112)
CHOLEST SERPL-MCNC: 197 MG/DL (ref ?–200)
CO2 SERPL-SCNC: 30 MMOL/L (ref 21–32)
CREAT BLD-MCNC: 1.08 MG/DL (ref 0.7–1.3)
EGFRCR SERPLBLD CKD-EPI 2021: 76 ML/MIN/1.73M2 (ref 60–?)
EOSINOPHIL # BLD AUTO: 0.24 X10(3) UL (ref 0–0.7)
EOSINOPHIL NFR BLD AUTO: 5.7 %
ERYTHROCYTE [DISTWIDTH] IN BLOOD BY AUTOMATED COUNT: 12.5 %
FASTING PATIENT LIPID ANSWER: YES
FASTING STATUS PATIENT QL REPORTED: YES
GLOBULIN PLAS-MCNC: 2.6 G/DL (ref 2–3.5)
GLUCOSE BLD-MCNC: 99 MG/DL (ref 70–99)
HCT VFR BLD AUTO: 44 % (ref 39–53)
HDLC SERPL-MCNC: 43 MG/DL (ref 40–59)
HGB BLD-MCNC: 15.2 G/DL (ref 13–17.5)
IMM GRANULOCYTES # BLD AUTO: 0.03 X10(3) UL (ref 0–1)
IMM GRANULOCYTES NFR BLD: 0.7 %
LDLC SERPL CALC-MCNC: 137 MG/DL (ref ?–100)
LYMPHOCYTES # BLD AUTO: 1.26 X10(3) UL (ref 1–4)
LYMPHOCYTES NFR BLD AUTO: 29.8 %
MCH RBC QN AUTO: 31.4 PG (ref 26–34)
MCHC RBC AUTO-ENTMCNC: 34.5 G/DL (ref 31–37)
MCV RBC AUTO: 90.9 FL (ref 80–100)
MONOCYTES # BLD AUTO: 0.43 X10(3) UL (ref 0.1–1)
MONOCYTES NFR BLD AUTO: 10.2 %
NEUTROPHILS # BLD AUTO: 2.24 X10 (3) UL (ref 1.5–7.7)
NEUTROPHILS # BLD AUTO: 2.24 X10(3) UL (ref 1.5–7.7)
NEUTROPHILS NFR BLD AUTO: 52.9 %
NONHDLC SERPL-MCNC: 154 MG/DL (ref ?–130)
OSMOLALITY SERPL CALC.SUM OF ELEC: 295 MOSM/KG (ref 275–295)
PLATELET # BLD AUTO: 178 10(3)UL (ref 150–450)
POTASSIUM SERPL-SCNC: 4.9 MMOL/L (ref 3.5–5.1)
PROT SERPL-MCNC: 6.9 G/DL (ref 5.7–8.2)
PSA SERPL-MCNC: <0.04 NG/ML (ref ?–4)
RBC # BLD AUTO: 4.84 X10(6)UL (ref 3.8–5.8)
SODIUM SERPL-SCNC: 142 MMOL/L (ref 136–145)
TRIGL SERPL-MCNC: 93 MG/DL (ref 30–149)
VLDLC SERPL CALC-MCNC: 17 MG/DL (ref 0–30)
WBC # BLD AUTO: 4.2 X10(3) UL (ref 4–11)

## 2025-07-09 PROCEDURE — 84153 ASSAY OF PSA TOTAL: CPT

## 2025-07-09 PROCEDURE — 85025 COMPLETE CBC W/AUTO DIFF WBC: CPT

## 2025-07-09 PROCEDURE — 36415 COLL VENOUS BLD VENIPUNCTURE: CPT

## 2025-07-09 PROCEDURE — 80061 LIPID PANEL: CPT

## 2025-07-09 PROCEDURE — 80053 COMPREHEN METABOLIC PANEL: CPT

## 2025-07-09 RX ORDER — EZETIMIBE 10 MG/1
10 TABLET ORAL NIGHTLY
Qty: 90 TABLET | Refills: 1 | Status: SHIPPED | OUTPATIENT
Start: 2025-07-09

## 2025-07-11 ENCOUNTER — TELEPHONE (OUTPATIENT)
Facility: CLINIC | Age: 65
End: 2025-07-11

## 2025-07-22 PROBLEM — D12.3 BENIGN NEOPLASM OF TRANSVERSE COLON: Status: ACTIVE | Noted: 2025-07-22

## 2025-07-22 PROBLEM — Z86.0101 PERSONAL HISTORY OF ADENOMATOUS AND SERRATED COLON POLYPS: Status: ACTIVE | Noted: 2025-07-22

## 2025-07-22 PROBLEM — D12.2 BENIGN NEOPLASM OF ASCENDING COLON: Status: ACTIVE | Noted: 2025-07-22

## 2025-08-11 ENCOUNTER — OFFICE VISIT (OUTPATIENT)
Facility: CLINIC | Age: 65
End: 2025-08-11

## 2025-08-11 VITALS
HEART RATE: 61 BPM | SYSTOLIC BLOOD PRESSURE: 112 MMHG | RESPIRATION RATE: 16 BRPM | DIASTOLIC BLOOD PRESSURE: 66 MMHG | BODY MASS INDEX: 25.32 KG/M2 | OXYGEN SATURATION: 98 % | WEIGHT: 169 LBS | HEIGHT: 68.7 IN

## 2025-08-11 DIAGNOSIS — J45.40 MODERATE PERSISTENT ASTHMA WITHOUT COMPLICATION (HCC): Primary | ICD-10-CM

## 2025-08-11 PROCEDURE — 99213 OFFICE O/P EST LOW 20 MIN: CPT | Performed by: INTERNAL MEDICINE

## 2025-08-11 RX ORDER — BUDESONIDE AND FORMOTEROL FUMARATE DIHYDRATE 160; 4.5 UG/1; UG/1
2 AEROSOL RESPIRATORY (INHALATION) 2 TIMES DAILY
Qty: 10.2 G | Refills: 5 | Status: SHIPPED | OUTPATIENT
Start: 2025-08-11

## 2025-08-11 RX ORDER — IPRATROPIUM BROMIDE 42 UG/1
1 SPRAY, METERED NASAL NIGHTLY
Qty: 1 EACH | Refills: 5 | Status: SHIPPED | OUTPATIENT
Start: 2025-08-11

## (undated) NOTE — LETTER
02/26/24        Brice PARTIDA Asmita  [address] [address]      Dear Brice,    Our records indicate that you have outstanding lab work and or testing that was ordered for you and has not yet been completed:  Orders Placed This Encounter      Lipid Panel    To provide you with the best possible care, please complete these orders at your earliest convenience. If you have recently completed these orders please disregard this letter.     If you have any questions please call the office at Dept: [phone].     Thank you,       Kathy Saldaña MD

## (undated) NOTE — MR AVS SNAPSHOT
[place]  [address]  [address] [facility] [address] [facility] [address] [phone]               Thank you for choosing us for your health care visit with Tori Fay MD.  We are glad to serve you and happy to provide you with this summa LIPID PANEL - CPX    Complete by:  Jan 26, 2017 (Approximate)    Assoc Dx:  Routine general medical examination at a health care facility [Z00.00]           TSH - CPX    Complete by:  Jan 26, 2017 (Approximate)    Assoc Dx:  Routine general medical examin authorization, such as [place], please feel free to schedule your appointment immediately. However, if you are unsure about the requirements for authorization, please wait 5-7 days and then contact your physician's office.  At that time, you will Water is best for hydration Fast food. Eat at home when possible     Tips for increasing your physical activity – Adults who are physically active are less likely to develop some chronic diseases than adults who are inactive.      HOW TO GET STARTED: HOW

## (undated) NOTE — LETTER
24      Orthopedic Surgery   Pre-Operative Clearance Request    Patient Name:   Brice Tian             :   4/10/1960    Surgeon: Dr. Fair             Date of Surgery: 2024    Surgical Procedure: LEFT SHOULDER ARTHROSCOPY ROTATOR CUFF REPAIR ARTHROSCOPIC BICEPS TENODESIS SUBACROMIAL DECOMPRESSION DISTAL CLAVICLE EXCISON       Please complete all of the following 2-3 weeks PRIOR TO your scheduled surgery to avoid potential cancellation.     [x]  History and Physical      [x]  Medical  Clearance                               **Please fax test results, H&P, and clearance to [phone] and to [facility] at [phone]

## (undated) NOTE — LETTER
Date: 5/23/2024    Patient Name: Brice Tian          To Whom it may concern:    The above patient is seen at [facility] for treatment of his medical conditions.    This patient is a 64 year old patient with history of high cholesterol and coronary artery calcification.  He has a strong family history of heart disease, including family members that have passed away as a result of heart disease.  It is important for him to have proper dietary information and counseling to help decrease the risk of major adverse cardiac events in the future.  For this reason, it is medically necessary for him to see a dietician for dietary counseling.      Sincerely,      Kathy Saldaña MD